# Patient Record
Sex: MALE | Race: WHITE
[De-identification: names, ages, dates, MRNs, and addresses within clinical notes are randomized per-mention and may not be internally consistent; named-entity substitution may affect disease eponyms.]

---

## 2018-07-07 ENCOUNTER — HOSPITAL ENCOUNTER (INPATIENT)
Dept: HOSPITAL 47 - EC | Age: 58
LOS: 2 days | Discharge: HOME | DRG: 287 | End: 2018-07-09
Payer: COMMERCIAL

## 2018-07-07 VITALS — BODY MASS INDEX: 32.8 KG/M2

## 2018-07-07 DIAGNOSIS — E78.5: ICD-10-CM

## 2018-07-07 DIAGNOSIS — I10: ICD-10-CM

## 2018-07-07 DIAGNOSIS — F41.9: ICD-10-CM

## 2018-07-07 DIAGNOSIS — I25.110: Primary | ICD-10-CM

## 2018-07-07 DIAGNOSIS — E66.9: ICD-10-CM

## 2018-07-07 DIAGNOSIS — E78.00: ICD-10-CM

## 2018-07-07 DIAGNOSIS — I25.2: ICD-10-CM

## 2018-07-07 DIAGNOSIS — F12.90: ICD-10-CM

## 2018-07-07 DIAGNOSIS — F17.200: ICD-10-CM

## 2018-07-07 DIAGNOSIS — M1A.9XX0: ICD-10-CM

## 2018-07-07 DIAGNOSIS — Z95.5: ICD-10-CM

## 2018-07-07 DIAGNOSIS — Z79.82: ICD-10-CM

## 2018-07-07 DIAGNOSIS — Z79.899: ICD-10-CM

## 2018-07-07 DIAGNOSIS — I25.82: ICD-10-CM

## 2018-07-07 LAB
ALBUMIN SERPL-MCNC: 4.1 G/DL (ref 3.5–5)
ALP SERPL-CCNC: 66 U/L (ref 38–126)
ALT SERPL-CCNC: 56 U/L (ref 21–72)
ANION GAP SERPL CALC-SCNC: 12 MMOL/L
APTT BLD: 23.1 SEC (ref 22–30)
AST SERPL-CCNC: 33 U/L (ref 17–59)
BASOPHILS # BLD AUTO: 0 K/UL (ref 0–0.2)
BASOPHILS NFR BLD AUTO: 0 %
BUN SERPL-SCNC: 15 MG/DL (ref 9–20)
CALCIUM SPEC-MCNC: 9.2 MG/DL (ref 8.4–10.2)
CHLORIDE SERPL-SCNC: 103 MMOL/L (ref 98–107)
CK SERPL-CCNC: 69 U/L (ref 55–170)
CK SERPL-CCNC: 70 U/L (ref 55–170)
CK SERPL-CCNC: 82 U/L (ref 55–170)
CO2 SERPL-SCNC: 23 MMOL/L (ref 22–30)
EOSINOPHIL # BLD AUTO: 0.1 K/UL (ref 0–0.7)
EOSINOPHIL NFR BLD AUTO: 1 %
ERYTHROCYTE [DISTWIDTH] IN BLOOD BY AUTOMATED COUNT: 4.95 M/UL (ref 4.3–5.9)
ERYTHROCYTE [DISTWIDTH] IN BLOOD: 13 % (ref 11.5–15.5)
GLUCOSE SERPL-MCNC: 112 MG/DL (ref 74–99)
HCT VFR BLD AUTO: 44 % (ref 39–53)
HGB BLD-MCNC: 15.1 GM/DL (ref 13–17.5)
INR PPP: 1 (ref ?–1.2)
LIPASE SERPL-CCNC: 149 U/L (ref 23–300)
LYMPHOCYTES # SPEC AUTO: 1.4 K/UL (ref 1–4.8)
LYMPHOCYTES NFR SPEC AUTO: 14 %
MAGNESIUM SPEC-SCNC: 1.8 MG/DL (ref 1.6–2.3)
MCH RBC QN AUTO: 30.6 PG (ref 25–35)
MCHC RBC AUTO-ENTMCNC: 34.4 G/DL (ref 31–37)
MCV RBC AUTO: 88.9 FL (ref 80–100)
MONOCYTES # BLD AUTO: 0.8 K/UL (ref 0–1)
MONOCYTES NFR BLD AUTO: 8 %
NEUTROPHILS # BLD AUTO: 7.3 K/UL (ref 1.3–7.7)
NEUTROPHILS NFR BLD AUTO: 75 %
PLATELET # BLD AUTO: 238 K/UL (ref 150–450)
POTASSIUM SERPL-SCNC: 4.1 MMOL/L (ref 3.5–5.1)
PROT SERPL-MCNC: 7.1 G/DL (ref 6.3–8.2)
PT BLD: 9.8 SEC (ref 9–12)
SODIUM SERPL-SCNC: 138 MMOL/L (ref 137–145)
TROPONIN I SERPL-MCNC: <0.012 NG/ML (ref 0–0.03)
WBC # BLD AUTO: 9.7 K/UL (ref 3.8–10.6)

## 2018-07-07 PROCEDURE — 85730 THROMBOPLASTIN TIME PARTIAL: CPT

## 2018-07-07 PROCEDURE — 94760 N-INVAS EAR/PLS OXIMETRY 1: CPT

## 2018-07-07 PROCEDURE — 83880 ASSAY OF NATRIURETIC PEPTIDE: CPT

## 2018-07-07 PROCEDURE — 93458 L HRT ARTERY/VENTRICLE ANGIO: CPT

## 2018-07-07 PROCEDURE — 80061 LIPID PANEL: CPT

## 2018-07-07 PROCEDURE — 71046 X-RAY EXAM CHEST 2 VIEWS: CPT

## 2018-07-07 PROCEDURE — 93005 ELECTROCARDIOGRAM TRACING: CPT

## 2018-07-07 PROCEDURE — 85610 PROTHROMBIN TIME: CPT

## 2018-07-07 PROCEDURE — 36415 COLL VENOUS BLD VENIPUNCTURE: CPT

## 2018-07-07 PROCEDURE — 83735 ASSAY OF MAGNESIUM: CPT

## 2018-07-07 PROCEDURE — 80053 COMPREHEN METABOLIC PANEL: CPT

## 2018-07-07 PROCEDURE — 84484 ASSAY OF TROPONIN QUANT: CPT

## 2018-07-07 PROCEDURE — 99291 CRITICAL CARE FIRST HOUR: CPT

## 2018-07-07 PROCEDURE — B2111ZZ FLUOROSCOPY OF MULTIPLE CORONARY ARTERIES USING LOW OSMOLAR CONTRAST: ICD-10-PCS

## 2018-07-07 PROCEDURE — 82550 ASSAY OF CK (CPK): CPT

## 2018-07-07 PROCEDURE — 96374 THER/PROPH/DIAG INJ IV PUSH: CPT

## 2018-07-07 PROCEDURE — B2131ZZ FLUOROSCOPY OF MULTIPLE CORONARY ARTERY BYPASS GRAFTS USING LOW OSMOLAR CONTRAST: ICD-10-PCS

## 2018-07-07 PROCEDURE — 4A023N7 MEASUREMENT OF CARDIAC SAMPLING AND PRESSURE, LEFT HEART, PERCUTANEOUS APPROACH: ICD-10-PCS

## 2018-07-07 PROCEDURE — 93306 TTE W/DOPPLER COMPLETE: CPT

## 2018-07-07 PROCEDURE — 82553 CREATINE MB FRACTION: CPT

## 2018-07-07 PROCEDURE — 85049 AUTOMATED PLATELET COUNT: CPT

## 2018-07-07 PROCEDURE — 83690 ASSAY OF LIPASE: CPT

## 2018-07-07 PROCEDURE — 85025 COMPLETE CBC W/AUTO DIFF WBC: CPT

## 2018-07-07 RX ADMIN — METOPROLOL TARTRATE SCH: 25 TABLET, FILM COATED ORAL at 09:24

## 2018-07-07 RX ADMIN — MIDAZOLAM ONE MG: 1 INJECTION INTRAMUSCULAR; INTRAVENOUS at 10:45

## 2018-07-07 RX ADMIN — HEPARIN SODIUM SCH MLS/HR: 5000 INJECTION, SOLUTION INTRAVENOUS at 07:01

## 2018-07-07 RX ADMIN — CEFAZOLIN SCH MLS/HR: 330 INJECTION, POWDER, FOR SOLUTION INTRAMUSCULAR; INTRAVENOUS at 15:34

## 2018-07-07 RX ADMIN — METOPROLOL TARTRATE SCH MG: 25 TABLET, FILM COATED ORAL at 20:08

## 2018-07-07 RX ADMIN — LISINOPRIL SCH: 10 TABLET ORAL at 09:24

## 2018-07-07 RX ADMIN — RANOLAZINE SCH MG: 500 TABLET, FILM COATED, EXTENDED RELEASE ORAL at 15:35

## 2018-07-07 RX ADMIN — MIDAZOLAM ONE MG: 1 INJECTION INTRAMUSCULAR; INTRAVENOUS at 10:38

## 2018-07-07 NOTE — HP
HISTORY AND PHYSICAL



DATE OF ADMISSION:

07/07/2018.



DATE OF SERVICE:

07/07/2018



PRESENT COMPLAINT:

Chest pressure.



HISTORY OF PRESENTING COMPLAINT:

This is a pleasant 57-year-old patient of Dr. Jeri Cespedes.  Chronic stable medical

conditions include hypertension, hyperlipidemia, anxiety, gout for which the patient is

on allopurinol.  The patient did undergo a coronary bypass in 2003.  For 3 days patient

has been noticing some neck and shoulder aching and then yesterday patient started

having a heaviness across the chest, did not radiate to the neck or arm.  There is no

shortness of breath.  No dizziness.  No lightheadedness.  No perspiration.  It lasted

for a good few hours.  The patient admitted with unstable angina diagnosis.  Troponins

were negative.  EKG showed some ST waves in V1 to V2.  The patient was taken for

cardiac catheterization by Dr. LOLA Marley.  Based on the findings, the decision was

made to do medical management.  The patient is currently lying in bed, comfortable.



REVIEW OF SYSTEMS:

CONSTITUTIONAL:  Tired.

HEENT:  None.

RESPIRATORY:  None.

CARDIOVASCULAR:  As above.

GASTROINTESTINAL:  None.

GENITOURINARY:  None.

MUSCULOSKELETAL:  None.

DERMATOLOGICAL:  None.

HEMATOLOGIC:  None.

LYMPHATIC:  None.

PSYCHIATRY:  Some anxiety.

NEUROLOGICAL:  None.



PAST MEDICAL HISTORY:

Hyperlipidemia, hypertension, coronary artery disease with bypass in 2003.



PAST SURGICAL HISTORY:

Cardiac cath with stent, hernia repair, orthopedic surgery.



PSYCH HISTORY:

Anxiety.



SOCIAL HISTORY:

Does 1 or 2 marijuana a day.  Alcohol occasionally.  .   by trade.



FAMILY HISTORY:

Reviewed, noncontributory to presentation.



HOME MEDICATIONS:

1. Vitamin D2 50,000 units p.o. every 7 days.

2. Lipitor 80 mg a day.

3. Allopurinol 300 mg a day.



ALLERGIES:

None.



EXAMINATION:

Temperature 98, pulse 75, respirations 18, blood pressure 159/83, pulse ox 98% on room

air on presentation.

GENERAL APPEARANCE:  Well-built, BMI 32.8.  Lying in bed, tired-appearing.

EYES:  Pupils equal.  Conjunctivae normal.

HEENT:  External appearance of nose and ears normal.  Oral cavity normal.

NECK:  JVD not raised.  Mass not palpable.

RESPIRATORY:  Effort normal.  Lungs are clear.

CARDIOVASCULAR:  First and second sounds normal.  No edema.

ABDOMEN:  Soft, nontender.  Liver and spleen not palpable.

LYMPHATIC:  No lymph node palpable in neck or axillae.

PSYCHIATRY:  Alert and oriented x3. Mood and affect normal.

NEUROLOGICAL:  Pupils equal.  Cranial nerves grossly intact.  Power and sensation

grossly intact.



INVESTIGATIONS:

White count 9.7, hemoglobin 15.1.  Potassium 4.1, BUN 15, creatinine 0.7.  Troponin x3

negative.  EKG:  Flipped T-waves in V1 and V2.  2D echocardiogram:  EF 55%-60%.



ASSESSMENT:

1. Unstable angina in a patient with known coronary artery disease.  Cardiac

    catheterization results noted.

2. Essential hypertension.

3. Hyperlipidemia.

4. Chronic gout.

5. Anxiety, not otherwise specified.

6. Obesity; BMI of 32.8.

7. Recreational marijuana use.



PLAN:

Patient is advised against that use of marijuana that may precipitate angina.  The

patient did receive IV heparin.  Nitrates were added.  Dose of beta blockers and

Ranexa.  Care was discussed the patient.  Questions were answered.  Cardiac

catheterization was done by Dr. LOLA Marley.





DEEJAY / PARISH: [id number] / Job#: [id number]

## 2018-07-07 NOTE — P.CRDCN
History of Present Illness


Consult date: 07/07/18


Requesting physician: Marco Rodas


Consult reason: chest pain


Chief complaint: Chest pain


History of present illness: 


This is a pleasant 57-year-old  gentleman who follows regularly with 

Dr. VC Marley in the office.  He has a known history of coronary artery disease 

with prior bypass surgery 15 years ago, patient also states that he underwent 

stent placement 5 years ago, history of hypertension, hyperlipidemia, 

nondiabetic, he does not smoke cigarettes but states that he does occasionally 

smoke marijuana.  Home medications include aspirin daily, Lipitor 80 mg daily, 

vitamin D, atenolol 50 mg daily, senna peripheral 10 mg daily, allopurinol 300 

mg daily.  He presents to the hospital on this occasion with symptoms of chest 

pressure and heaviness.  He states that the chest pressure started yesterday 

afternoon, but for the past couple of days he's been noticing a discomfort in 

his neck and shoulder area on the left and on the right.  Patient denies any 

associated shortness of breath or diaphoresis.  He states that the discomfort 

reminds him of what he had prior to his bypass surgery.  EKG on presentation 

here shows a normal sinus rhythm with ST-T wave changes noted in the anterior 

leads.  Upon review of an EKG performed previously these changes were not 

noted.  Chest x-ray does not reveal any active cardiopulmonary disease.  Blood 

pressure 158/80 on admission, 135/70 this morning, heart rate in the 70s, 97% 

on 2 L of oxygen.  White blood cell count 9.7, hemoglobin 15.1, platelet count 

238.  Sodium 138, potassium 4.1, BUN 15, creatinine 0.7.  Magnesium 1.8.  

Troponin 0.012.  At the time of my examination this morning he is currently 

chest pain-free.








Past Medical History


Past Medical History: Chest Pain / Angina, Hyperlipidemia, Hypertension, 

Myocardial Infarction (MI)


Additional Past Medical History / Comment(s): Triple bypass


History of Any Multi-Drug Resistant Organisms: None Reported


Past Surgical History: Heart Catheterization With Stent, Hernia Repair, 

Orthopedic Surgery


Past Psychological History: Anxiety


Smoking Status: Never smoker


Past Alcohol Use History: Daily


Past Drug Use History: Marijuana





Medications and Allergies


 Allergies











Allergy/AdvReac Type Severity Reaction Status Date / Time


 


No Known Allergies Allergy   Verified 07/07/18 05:39














Physical Exam


Vitals: 


 Vital Signs











  Temp Pulse Resp BP Pulse Ox


 


 07/07/18 07:04   70  16  135/77  97


 


 07/07/18 05:35  98 F  75  18  159/83  98








 Intake and Output











 07/06/18 07/07/18 07/07/18





 22:59 06:59 14:59


 


Other:   


 


  Weight  100.698 kg 














PHYSICAL EXAMINATION: 





GENERAL: 57-year-old  gentleman in no acute distress at the time of my 

examination





HEENT: Head is atraumatic, normocephalic.  Pupils equal, round.  Sclera 

anicteric. Conjunctiva are clear.  Mucous membranes of the mouth are moist.  

Neck is supple.  There is no elevated jugular venous pressure.]  bruit is heard.





HEART EXAMINATION: Heart S1, S2 normal.  No murmur or gallop heard.





CHEST EXAMINATION: Lungs are clear to auscultation and precussion. No chest 

wall tenderness is noted on palpation or with deep breathing.





ABDOMEN:  Soft, nontender. Bowel sounds are heard. No organomegaly noted.


 


EXTREMITIES: 2+ peripheral pulses with no evidence of peripheral edema and no 

calf tenderness noted.





NEUROLOGIC patient is awake, alert and oriented ?-3.


 


.


 








Results





 07/07/18 06:00





 07/07/18 06:00


 Cardiac Enzymes











  07/07/18 07/07/18 Range/Units





  06:00 06:00 


 


AST   33  (17-59)  U/L


 


CK-MB (CK-2)  1.6   (0.0-2.4)  ng/mL


 


Troponin I  <0.012   (0.000-0.034)  ng/mL








 Coagulation











  07/07/18 Range/Units





  06:00 


 


PT  9.8  (9.0-12.0)  sec


 


APTT  23.1  (22.0-30.0)  sec








 CBC











  07/07/18 Range/Units





  06:00 


 


WBC  9.7  (3.8-10.6)  k/uL


 


RBC  4.95  (4.30-5.90)  m/uL


 


Hgb  15.1  (13.0-17.5)  gm/dL


 


Hct  44.0  (39.0-53.0)  %


 


Plt Count  238  (150-450)  k/uL








 Comprehensive Metabolic Panel











  07/07/18 Range/Units





  06:00 


 


Sodium  138  (137-145)  mmol/L


 


Potassium  4.1  (3.5-5.1)  mmol/L


 


Chloride  103  ()  mmol/L


 


Carbon Dioxide  23  (22-30)  mmol/L


 


BUN  15  (9-20)  mg/dL


 


Creatinine  0.70  (0.66-1.25)  mg/dL


 


Glucose  112 H  (74-99)  mg/dL


 


Calcium  9.2  (8.4-10.2)  mg/dL


 


AST  33  (17-59)  U/L


 


ALT  56  (21-72)  U/L


 


Alkaline Phosphatase  66  ()  U/L


 


Total Protein  7.1  (6.3-8.2)  g/dL


 


Albumin  4.1  (3.5-5.0)  g/dL








 Current Medications











Generic Name Dose Route Start Last Admin





  Trade Name Freq  PRN Reason Stop Dose Admin


 


Aspirin  325 mg  07/08/18 09:00  





  Aspirin  PO   





  DAILY [facility]   





     





     





     





     


 


Atorvastatin Calcium  80 mg  07/07/18 09:00  





  Lipitor  PO   





  DAILY [facility]   





     





     





     





     


 


Heparin Sodium (Porcine)  0 unit  07/07/18 06:21  





  Heparin  IV   





  Q6HR PRN   





  Low PTT   





     





  Protocol   





     


 


Heparin Sodium/Sodium Chloride  500 mls @ 20 mls/hr  07/07/18 06:30  07/07/18 07

:01





  25,000 unit/ Sodium Chloride  IV   9.93 units/kg/hr





  .Q24H JOYCELYN   19.99 mls/hr





     Administration





     





  Protocol   





     


 


Metoprolol Tartrate  25 mg  07/07/18 09:00  





  Lopressor  PO   





  BID [facility]   





     





     





     





     


 


Nitroglycerin  0.4 mg  07/07/18 06:21  





  Nitrostat  SUBLINGUAL   





  Q5M PRN   





  Chest Pain   





     





     





     








 Intake and Output











 07/06/18 07/07/18 07/07/18





 22:59 06:59 14:59


 


Other:   


 


  Weight  100.698 kg 








 





 07/07/18 06:00 





 07/07/18 06:00 











EKG Interpretations (text)


EKG shows normal sinus rhythm with nonspecific ST-T wave changes in the 

anterior leads








Assessment and Plan


Plan: 


Assessment and plan


#1 chest pressure and heaviness suggestive of possible acute coronary syndrome, 

initial troponin is negative.  EKG shows a normal sinus rhythm with nonspecific 

ST-T wave changes noted in the anterior leads


#2 known history of coronary artery disease with history of coronary artery 

bypass grafting surgery 15 years ago at which time patient underwent radial 

artery graft to the OM, LIMA to the LAD, saphenous vein graft to the RCA, 

subsequent to that patient underwent stenting of the first obtuse marginal 

branch in 2013.


#3 hypertension


#4 hyperlipidemia


#5 marijuana use








Plan


We will obtain an echocardiogram with Doppler study as well as 2 subsequent 

troponins.  A repeat EKG will also be performed.  We will continue with aspirin

, IV heparin, Lipitor 80 mg daily, metoprolol 25 mg one tablet by mouth twice a 

day, we will also resume the patient's lisinopril.  Patient may require cardiac 

catheterization to rule out any obstructive coronary artery disease, the risks 

and the benefits again were explained to the patient in detail.  Further 

recommendations to follow.





DNP note has been reviewed, I agree with a documented findings and plan of 

care.  Patient was seen and examined.

## 2018-07-07 NOTE — CC
CARDIAC CATHETERIZATION REPORT



Mr. Hoang is a 57-year-old gentleman who was admitted with symptoms suggestive of

unstable angina syndrome.  Patient's cardiac enzymes are negative.  Patient has a past

history of stent to the diagonal branch and coronary artery bypass surgery.  Patient

had some T-wave inversion in V1 and V2.  In view of that, the patient was recommended

to have a cardiac catheterization for definitive diagnosis.



PROCEDURE:

The right groin was prepped and draped in the usual manner and the skin was infiltrated

with 2% Xylocaine. The right femoral artery was entered using Seldinger technique, a #6-

Telugu sheath was placed in.  Selective coronary angiography was then performed in

multiple projections and selective injections of the grafts were made. Patient

tolerated the procedure well.



Left main coronary artery is normally patent.

LAD is totally occluded in its ostial portion.

There is a good size septal  and then there is a diagonal branch.  The

diagonal branch is a good caliber blood vessel and the stent is patent with 30%

stenosis in its proximal portion.

The circumflex coronary artery gives rise to several obtuse marginal branches and after

the origin of the 2 small size obtuse marginal branches, circumflex coronary artery is

totally occluded.

Right coronary artery is totally occluded in its proximal portion.

The saphenous vein graft to the RCA is ectatic and the large caliber graft with

slightly sluggish flow but there is no hemodynamically significant stenosis is noted.

There is a good filling of the PLV branch of the right coronary artery.

It is noted the radial artery graft to the obtuse marginal branch is patent.  The LIMA

graft to the LAD is patent.



IMPRESSION:

In view of the above coronary anatomy, we will maximize the medical treatment.





MMODL / IJN: [id number] / Job#: [id number]

## 2018-07-07 NOTE — ED
General Adult HPI





- General


Chief complaint: Chest Pain


Stated complaint: Chest Pain


Time Seen by Provider: 07/07/18 05:53


Source: patient, RN notes reviewed, old records reviewed


Mode of arrival: ambulatory


Limitations: no limitations





- History of Present Illness


Initial comments: 





This is a 57-year-old male the ER for evasive chest pain.  Patient is anterior 

severe chest pain.  Prior history of heart disease prior history of heart bypass

, high blood pressure high cholesterol.  Patient also suffers from angina.  No 

recent cardiac evaluation.  No fevers cough or congestion.





- Related Data


 Allergies











Allergy/AdvReac Type Severity Reaction Status Date / Time


 


No Known Allergies Allergy   Verified 07/07/18 05:39














Review of Systems


ROS Statement: 


Those systems with pertinent positive or pertinent negative responses have been 

documented in the HPI.





ROS Other: All systems not noted in ROS Statement are negative.





Past Medical History


Past Medical History: Chest Pain / Angina, Hyperlipidemia, Hypertension, 

Myocardial Infarction (MI)


Additional Past Medical History / Comment(s): Triple bypass


History of Any Multi-Drug Resistant Organisms: None Reported


Past Surgical History: Heart Catheterization With Stent, Hernia Repair, 

Orthopedic Surgery


Past Psychological History: Anxiety


Smoking Status: Never smoker


Past Alcohol Use History: Daily


Past Drug Use History: Marijuana





General Exam


Limitations: no limitations


General appearance: alert, in no apparent distress


Head exam: Present: atraumatic, normocephalic, normal inspection


Eye exam: Present: normal appearance, PERRL, EOMI.  Absent: scleral icterus, 

conjunctival injection, periorbital swelling


ENT exam: Present: normal exam, mucous membranes moist


Neck exam: Present: normal inspection.  Absent: tenderness, meningismus, 

lymphadenopathy


Respiratory exam: Present: normal lung sounds bilaterally.  Absent: respiratory 

distress, wheezes, rales, rhonchi, stridor


Cardiovascular Exam: Present: regular rate, normal rhythm, normal heart sounds.

  Absent: systolic murmur, diastolic murmur, rubs, gallop, clicks


GI/Abdominal exam: Present: soft, normal bowel sounds.  Absent: distended, 

tenderness, guarding, rebound, rigid


Extremities exam: Present: normal inspection, full ROM, normal capillary 

refill.  Absent: tenderness, pedal edema, joint swelling, calf tenderness


Back exam: Present: normal inspection


Neurological exam: Present: alert, oriented X3, CN II-XII intact


Psychiatric exam: Present: normal affect, normal mood


Skin exam: Present: warm, dry, intact, normal color.  Absent: rash





Course


 Vital Signs











  07/07/18





  05:35


 


Temperature 98 F


 


Pulse Rate 75


 


Respiratory 18





Rate 


 


Blood Pressure 159/83


 


O2 Sat by Pulse 98





Oximetry 














- Reevaluation(s)


Reevaluation #1: 





07/07/18 06:28


Medical record is reviewed and noncontributory


Reevaluation #2: 





07/07/18 06:28


Patient remains a chest pain





EKG Findings





- EKG Comments:


EKG Findings:: EKG shows sinus rhythm rate of 69, , QRS 02, QTc 417





Medical Decision Making





- Medical Decision Making





57 male the ER for evaluation of chest pain.  History of angina and cardiac 

surgery.  Patient admitted admitted for cardiac observation today





- Lab Data


Result diagrams: 


 07/07/18 06:00





 07/07/18 06:00


 Lab Results











  07/07/18 07/07/18 07/07/18 Range/Units





  06:00 06:00 06:00 


 


WBC  9.7    (3.8-10.6)  k/uL


 


RBC  4.95    (4.30-5.90)  m/uL


 


Hgb  15.1    (13.0-17.5)  gm/dL


 


Hct  44.0    (39.0-53.0)  %


 


MCV  88.9    (80.0-100.0)  fL


 


MCH  30.6    (25.0-35.0)  pg


 


MCHC  34.4    (31.0-37.0)  g/dL


 


RDW  13.0    (11.5-15.5)  %


 


Plt Count  238    (150-450)  k/uL


 


Neutrophils %  75    %


 


Lymphocytes %  14    %


 


Monocytes %  8    %


 


Eosinophils %  1    %


 


Basophils %  0    %


 


Neutrophils #  7.3    (1.3-7.7)  k/uL


 


Lymphocytes #  1.4    (1.0-4.8)  k/uL


 


Monocytes #  0.8    (0-1.0)  k/uL


 


Eosinophils #  0.1    (0-0.7)  k/uL


 


Basophils #  0.0    (0-0.2)  k/uL


 


PT    9.8  (9.0-12.0)  sec


 


INR    1.0  (<1.2)  


 


APTT    23.1  (22.0-30.0)  sec


 


Sodium   138   (137-145)  mmol/L


 


Potassium   4.1   (3.5-5.1)  mmol/L


 


Chloride   103   ()  mmol/L


 


Carbon Dioxide   23   (22-30)  mmol/L


 


Anion Gap   12   mmol/L


 


BUN   15   (9-20)  mg/dL


 


Creatinine   0.70   (0.66-1.25)  mg/dL


 


Est GFR (CKD-EPI)AfAm   >90   (>60 ml/min/1.73 sqM)  


 


Est GFR (CKD-EPI)NonAf   >90   (>60 ml/min/1.73 sqM)  


 


Glucose   112 H   (74-99)  mg/dL


 


Calcium   9.2   (8.4-10.2)  mg/dL


 


Magnesium   1.8   (1.6-2.3)  mg/dL


 


Total Bilirubin   0.8   (0.2-1.3)  mg/dL


 


AST   33   (17-59)  U/L


 


ALT   56   (21-72)  U/L


 


Alkaline Phosphatase   66   ()  U/L


 


Total Protein   7.1   (6.3-8.2)  g/dL


 


Albumin   4.1   (3.5-5.0)  g/dL


 


Lipase   149   ()  U/L














- Radiology Data


Radiology results: report reviewed (Chest x-rays negative for acute disease), 

image reviewed





Critical Care Time


Critical Care Time: Yes


Total Critical Care Time: 31





Disposition


Clinical Impression: 


 Chest pain





Disposition: ADMITTED AS IP TO THIS [facility]


Condition: Undetermined


Is patient prescribed a controlled substance at d/c from ED?: No

## 2018-07-07 NOTE — XR
EXAMINATION TYPE: XR chest 2V

 

DATE OF EXAM: 7/7/2018

 

COMPARISON: 4/11/2013

 

HISTORY: Chest pain

 

TECHNIQUE:  Frontal and lateral views of the chest are obtained.

 

FINDINGS:  There is no heart failure nor confluent pneumonic infiltrate. Costophrenic angles are chauncey
r. There are sternal wires. There are chest leads.

 

IMPRESSION:  No active cardiopulmonary disease. No change.

## 2018-07-07 NOTE — ECHOF
Referral Reason:chest pain



MEASUREMENTS

--------

HEIGHT: 175.3 cm

WEIGHT: 100.7 kg

BP: 135/77

RVIDd:   3.0 cm     (< 3.3)

IVSd:   1.3 cm     (0.6 - 1.1)

LVIDd:   5.7 cm     (3.9 - 5.3)

LVPWd:   1.3 cm     (0.6 - 1.1)

IVSs:   1.5 cm

LVIDs:   4.0 cm

LVPWs:   1.4 cm

LAESV Index (A-L):   31.64 ml/m

Ao Diam:   3.7 cm     (2.0 - 3.7)

AV Cusp:   2.0 cm     (1.5 - 2.6)

LA Diam:   4.3 cm     (2.7 - 3.8)

EPSS:   0.8 cm

MV E Amado:   1.26 m/s

MV DecT:   231 ms

MV A Amado:   0.74 m/s

MV E/A Ratio:   1.70 

RAP:   5.00 mmHg

RVSP:   30.40 mmHg

MV EF SLOPE:   88.03 mm/s     (70 - 150)

MV EXCURSION:   1.85 cm     (> 18.000)







FINDINGS

--------

Sinus rhythm.

This was a technically adequate study.

The left ventricular size is normal.   There is mild concentric left ventricular hypertrophy.   Overa
ll left ventricular systolic function is normal with, an EF between 55 - 60 %.

The right ventricle is normal in size and function.

LA is midly dilated 29-33ml/m2.

RA appears enlarged.

Aortic valve is trileaflet and is mildly thickened.   There is no evidence of aortic regurgitation.  
 There is no evidence of aortic stenosis.

The mitral valve leaflets are mildly thickened.   There is trace to mild mitral regurgitation.

Mild tricuspid regurgitation present.   Right ventricular systolic pressure is normal at < 35 mmHg.  
 There is no evidence of pulmonary hypertension.

Trace/mild (physiologic)  pulmonic regurgitation.

The aortic root size is normal.

Normal inferior vena cava with normal inspiratory collapse consistent with estimated right atrial pre
ssure of  5 mmHg.

There is no pericardial effusion.



CONCLUSIONS

--------

1. Sinus rhythm.

2. This was a technically adequate study.

3. The left ventricular size is normal.

4. There is mild concentric left ventricular hypertrophy.

5. Overall left ventricular systolic function is normal with, an EF between 55 - 60 %.

6. LA is midly dilated 29-33ml/m2.

7. RA appears enlarged.

8. Aortic valve is trileaflet and is mildly thickened.

9. The mitral valve leaflets are mildly thickened.

10. There is trace to mild mitral regurgitation.

11. Mild tricuspid regurgitation present.

12. Right ventricular systolic pressure is normal at < 35 mmHg.

13. There is no evidence of pulmonary hypertension.

14. Trace/mild (physiologic)  pulmonic regurgitation.

15. The aortic root size is normal.

16. There is no pericardial effusion.





SONOGRAPHER: Stepan Medina RDCS

## 2018-07-08 VITALS — RESPIRATION RATE: 16 BRPM

## 2018-07-08 LAB
CHOLEST SERPL-MCNC: 119 MG/DL (ref ?–200)
HDLC SERPL-MCNC: 37 MG/DL (ref 40–60)
LDLC SERPL CALC-MCNC: 54 MG/DL (ref 0–99)
PLATELET # BLD AUTO: 240 K/UL (ref 150–450)
TRIGL SERPL-MCNC: 141 MG/DL (ref ?–150)

## 2018-07-08 RX ADMIN — ASPIRIN 325 MG ORAL TABLET SCH MG: 325 PILL ORAL at 08:17

## 2018-07-08 RX ADMIN — CEFAZOLIN SCH MLS/HR: 330 INJECTION, POWDER, FOR SOLUTION INTRAMUSCULAR; INTRAVENOUS at 08:07

## 2018-07-08 RX ADMIN — METOPROLOL TARTRATE SCH MG: 25 TABLET, FILM COATED ORAL at 20:11

## 2018-07-08 RX ADMIN — ALLOPURINOL SCH MG: 300 TABLET ORAL at 08:18

## 2018-07-08 RX ADMIN — HEPARIN SODIUM SCH: 5000 INJECTION, SOLUTION INTRAVENOUS at 08:06

## 2018-07-08 RX ADMIN — RANOLAZINE SCH MG: 500 TABLET, FILM COATED, EXTENDED RELEASE ORAL at 20:11

## 2018-07-08 RX ADMIN — ISOSORBIDE MONONITRATE SCH MG: 30 TABLET, EXTENDED RELEASE ORAL at 08:17

## 2018-07-08 RX ADMIN — ACETAMINOPHEN PRN MG: 325 TABLET, FILM COATED ORAL at 16:44

## 2018-07-08 RX ADMIN — ACETAMINOPHEN PRN MG: 325 TABLET, FILM COATED ORAL at 08:16

## 2018-07-08 RX ADMIN — LISINOPRIL SCH MG: 10 TABLET ORAL at 08:18

## 2018-07-08 RX ADMIN — RANOLAZINE SCH MG: 500 TABLET, FILM COATED, EXTENDED RELEASE ORAL at 08:17

## 2018-07-08 RX ADMIN — CEFAZOLIN SCH: 330 INJECTION, POWDER, FOR SOLUTION INTRAMUSCULAR; INTRAVENOUS at 16:57

## 2018-07-08 RX ADMIN — METOPROLOL TARTRATE SCH MG: 25 TABLET, FILM COATED ORAL at 08:18

## 2018-07-08 RX ADMIN — ATORVASTATIN CALCIUM SCH MG: 80 TABLET, FILM COATED ORAL at 08:18

## 2018-07-08 NOTE — P.PN
Subjective


Progress Note Date: 07/08/18


Principal diagnosis: 





Chest discomfort





This is a pleasant 57-year-old gentleman who sees Dr. VC Marley with history of 

coronary artery disease and status post coronary artery bypass grafting 3 with 

LIMA to LAD, SVG to OM, and SVG into RCA, presented to the hospital with chest 

discomfort concerning for unstable angina.  He underwent heart catheterization 

yesterday and that revealed severe triple-vessel CAD with patent LIMA to LAD, 

patent SVG to OM, and patent SVG to RCA with quite sluggish flow in the SVG to 

RCA but without any hemodynamically significant stenosis.  Maximize medical 

treatment was recommended and the patient was started on Imdur in addition to 

an excellent.





On follow-up with him today, he still having chest discomfort definitely better 

compared to before but still there.  No shortness of breath.  No dizziness or 

lightheadedness.





The blood pressure and heart rate are within normal limits.





I recommended keeping the patient for additional 24 hours.  We'll continue 

following up with him.





Objective





- Vital Signs


Vital signs: 


 Vital Signs











Temp  96.7 F L  07/08/18 04:00


 


Pulse  75   07/08/18 04:00


 


Resp  16   07/08/18 04:00


 


BP  125/76   07/08/18 04:00


 


Pulse Ox  96   07/08/18 08:12








 Intake & Output











 07/07/18 07/08/18 07/08/18





 18:59 06:59 18:59


 


Intake Total 1020  


 


Output Total 0 0 


 


Balance 1020 0 


 


Weight 100.69 kg 102.1 kg 


 


Intake:   


 


    


 


  Oral 720  


 


Output:   


 


  Urine 0  


 


  Stool 0 0 


 


  Urine/Stool Mix 0  


 


  Emesis 0  


 


  Oral Regurgitation 0  


 


Other:   


 


  # Voids 1 2 


 


  # Bowel Movements 0  














- Constitutional


General appearance: Present: no acute distress





- Respiratory


Respiratory: bilateral: CTA





- Cardiovascular


Rhythm: regular


Heart sounds: normal: S1, S2





- Labs


CBC & Chem 7: 


 07/08/18 05:50





 07/07/18 06:00


Labs: 


 Abnormal Lab Results - Last 24 Hours (Table)











  07/08/18 Range/Units





  05:50 


 


HDL Cholesterol  37 L  (40-60)  mg/dL














Assessment and Plan


Assessment: 





Assessment


#1 chest discomfort likely to be anginal.


#2 severe underlying CAD and status post CABG as described above


#3 multiple comorbid conditions including hypertension and dyslipidemia





Plan 


#1 continue the current medical regimen including the Imdur as well as Ranexa


#2 continue monitoring the patient for additional 24 hours 


#3 follow-up with the patient.

## 2018-07-08 NOTE — PN
PROGRESS NOTE



DATE OF SERVICE:

July 8, 2018.



PRESENTING COMPLAINT:

Chest pressure.



INTERVAL HISTORY:

This is a patient admitted with unstable angina with cardiac catheterization.  Decision

was made for medical management.  Imdur and Ranexa was added.  The patient had some

more episodes of chest pressure, though feels a bit better.



REVIEW OF SYSTEMS:

Done for constitutional, cardiovascular, GI, pulmonary and relevant findings as above.



CURRENT MEDICATIONS:

Reviewed that include Imdur and Ranexa.



PHYSICAL EXAMINATION:

VITAL SIGNS: Temperature 97, pulse 65, respiratory 18, blood pressure 127/71, pulse ox

95% on room air.

GENERAL APPEARANCE:  Sitting up edge of the bed.

EYES: Pupils are equal. Conjunctivae normal.

HEENT:  External appearance of nose and ears normal.

NECK:  JVD not raised.  Mass not palpable.

RESPIRATORY:  Effort normal.  Lungs are clear.

CARDIOVASCULAR:  1st and second sounds normal.  No edema.

ABDOMEN:  Soft, nontender.  Liver and spleen not palpable.  PSYCHIATRY:  Alert and

oriented x3.  Mood and affect normal.



INVESTIGATIONS:

No labs from today.  LDL is 54.



ASSESSMENT:

1. Unstable angina in a patient with known coronary artery disease status post cardiac

    catheterization.

2. Essential hypertension.

3. Hyperlipidemia.

4. Chronic gout.

5. Anxiety not otherwise specified.

6. Obesity; BMI 32.8.

7. Recreational marijuana use.

8. Coronary artery disease with no prior history of coronary bypass.



PLAN:

Medications were adjusted by Cardiology.  Patient is still having some headaches, felt

to be from Imdur.  If this remains a problem, then we may have to discontinue the same.

I encouraged the patient to ambulate.





MMODL / IJN: [id number] / Job#: [id number]

## 2018-07-09 VITALS — TEMPERATURE: 97 F

## 2018-07-09 VITALS — SYSTOLIC BLOOD PRESSURE: 109 MMHG | DIASTOLIC BLOOD PRESSURE: 71 MMHG | HEART RATE: 81 BPM

## 2018-07-09 LAB — PLATELET # BLD AUTO: 245 K/UL (ref 150–450)

## 2018-07-09 RX ADMIN — RANOLAZINE SCH MG: 500 TABLET, FILM COATED, EXTENDED RELEASE ORAL at 08:58

## 2018-07-09 RX ADMIN — LISINOPRIL SCH MG: 10 TABLET ORAL at 08:58

## 2018-07-09 RX ADMIN — ACETAMINOPHEN PRN MG: 325 TABLET, FILM COATED ORAL at 15:22

## 2018-07-09 RX ADMIN — HEPARIN SODIUM SCH: 5000 INJECTION, SOLUTION INTRAVENOUS at 08:59

## 2018-07-09 RX ADMIN — ASPIRIN 325 MG ORAL TABLET SCH MG: 325 PILL ORAL at 08:58

## 2018-07-09 RX ADMIN — ATORVASTATIN CALCIUM SCH MG: 80 TABLET, FILM COATED ORAL at 08:58

## 2018-07-09 RX ADMIN — ISOSORBIDE MONONITRATE SCH MG: 30 TABLET, EXTENDED RELEASE ORAL at 08:58

## 2018-07-09 RX ADMIN — METOPROLOL TARTRATE SCH MG: 25 TABLET, FILM COATED ORAL at 08:58

## 2018-07-09 RX ADMIN — ALLOPURINOL SCH MG: 300 TABLET ORAL at 08:58

## 2018-07-09 NOTE — P.PN
Subjective


Progress Note Date: 07/09/18





This is a pleasant 57-year-old  gentleman who follows regularly with 

Dr. VC Marley in the office.  He has a known history of coronary artery disease 

with prior bypass surgery 15 years ago, patient also states that he underwent 

stent placement 5 years ago, history of hypertension, hyperlipidemia, 

nondiabetic, he does not smoke cigarettes but states that he does occasionally 

smoke marijuana.  Home medications include aspirin daily, Lipitor 80 mg daily, 

vitamin D, atenolol 50 mg daily, senna peripheral 10 mg daily, allopurinol 300 

mg daily.  He presents to the hospital on this occasion with symptoms of chest 

pressure and heaviness.  He states that the chest pressure started yesterday 

afternoon, but for the past couple of days he's been noticing a discomfort in 

his neck and shoulder area on the left and on the right.  Patient denies any 

associated shortness of breath or diaphoresis.  He states that the discomfort 

reminds him of what he had prior to his bypass surgery.  EKG on presentation 

here shows a normal sinus rhythm with ST-T wave changes noted in the anterior 

leads.  Upon review of an EKG performed previously these changes were not 

noted.  Chest x-ray does not reveal any active cardiopulmonary disease.  Blood 

pressure 158/80 on admission, 135/70 this morning, heart rate in the 70s, 97% 

on 2 L of oxygen.  White blood cell count 9.7, hemoglobin 15.1, platelet count 

238.  Sodium 138, potassium 4.1, BUN 15, creatinine 0.7.  Magnesium 1.8.  

Troponin 0.012.  At the time of my examination this morning he is currently 

chest pain-free.








07/09/2018


Patient was seen and examined this morning, he underwent a cardiac 

catheterization by Dr. VC Marley on the weekend, the decision was made to 

maximize the patient's medical therapy.  Imdur and Ranexa were added to his 

medication regime.  At the time of my examination this morning, he complained 

of a mild discomfort in the left upper chest area which she rated at 

approximately 01, he's been ambulating in the beard without any chest 

discomfort.  From cardiology's perspective, he may be able to be discharged 

home today to follow-up with Dr. VC Marley in the office.








Objective





- Vital Signs


Vital signs: 


 Vital Signs











Temp  97.0 F L  07/09/18 09:01


 


Pulse  88   07/09/18 09:01


 


Resp  16   07/09/18 09:01


 


BP  130/85   07/09/18 09:01


 


Pulse Ox  96   07/09/18 09:01








 Intake & Output











 07/08/18 07/09/18 07/09/18





 18:59 06:59 18:59


 


Intake Total 830  360


 


Balance 830  360


 


Weight  99.382 kg 


 


Intake:   


 


  Intake, IV Titration 600  





  Amount   


 


    Sodium Chloride 0.9% 1, 600  





    000 ml @ 75 mls/hr IV .   





    [id number] [facility] Rx#:[id number]   


 


  Oral 230  360


 


Other:   


 


  # Voids 3 2 














- Exam





PHYSICAL EXAMINATION: 





GENERAL: 57-year-old  gentleman in no acute distress at the time of my 

examination





HEENT: Head is atraumatic, normocephalic.  Pupils equal, round.  Sclera 

anicteric. Conjunctiva are clear.  Mucous membranes of the mouth are moist.  

Neck is supple.  There is no elevated jugular venous pressure.]  No carotid 

bruit is heard.





HEART EXAMINATION: Heart S1, S2 normal.  No murmur or gallop heard.





CHEST EXAMINATION: Lungs are clear to auscultation and precussion. No chest 

wall tenderness is noted on palpation or with deep breathing.





ABDOMEN:  Soft, nontender. Bowel sounds are heard. No organomegaly noted.


 


EXTREMITIES: 2+ peripheral pulses with no evidence of peripheral edema and no 

calf tenderness noted.





NEUROLOGIC patient is awake, alert and oriented OX3.


 


.


 











- Labs


CBC & Chem 7: 


 07/09/18 05:55





 07/07/18 06:00





Assessment and Plan


Plan: 


Assessment and plan


#1 chest pressure and heaviness suggestive of possible acute coronary syndrome, 

initial troponin is negative.  EKG shows a normal sinus rhythm with nonspecific 

ST-T wave changes noted in the anterior leads.  Status post cardiac 

catheterization maximized medical therapy recommended.


#2 known history of coronary artery disease with history of coronary artery 

bypass grafting surgery 15 years ago at which time patient underwent radial 

artery graft to the OM, LIMA to the LAD, saphenous vein graft to the RCA, 

subsequent to that patient underwent stenting of the first obtuse marginal 

branch in 2013.


#3 hypertension


#4 hyperlipidemia


#5 marijuana use








Plan


Echocardiogram with Doppler study was performed which revealed a normal left 

ventricular systolic function.  From cardiology's perspective, patient may be 

able to be discharged home today.  We will make him a follow-up appointment in 

the office to see Dr. VC Marley post discharge.  Patient will continue on 

aspirin 81 mg daily, Lipitor 80 mg daily, Imdur 30 mg daily, lisinopril 10 mg 

daily, metoprolol 25 mg by mouth twice a day, Ranexa 500 mg twice a day, and 

sublingual nitroglycerin as needed for chest pain.








DNP note has been reviewed, I agree with a documented findings and plan of 

care.  Patient was seen and examined.

## 2018-07-09 NOTE — DS
DISCHARGE SUMMARY



DATE OF ADMISSION:

18.



DATE OF DISCHARGE:

18



FINAL DIAGNOSES:

1. Unstable angina in patient with known coronary artery disease.

2. Essential hypertension.

3. Hyperlipidemia.

4. Chronic gout.

5. Anxiety, not otherwise specified.

6. Obesity; BMI 32.8.

7. Recreational marijuana use.

8. Coronary artery disease with prior history of cardiac catheterization with stent.



PROCEDURE:

Cardiac catheterization.



CONSULTATIONS:

Dr. Hopkins from Cardiology.



HOSPITAL COURSE:

This patient with unstable angina.  Cardiac catheterization was carried out.  More

details in Dr. Hopkins's note.  Medical management was decided. Imdur and Ranexa were

added. Today, patient is up and about no further cardiac symptoms.  I did speak to him

at length about his medications.  Questions were answered.  He was wondering if

anything further can be done with new techniques.  I did suggest he should talk to Dr. Hopkins in the office when he sees him again and he may direct him accordingly if any

other options are available.  Echocardiogram showed EF of 55-60%.  No obvious wall

motion abnormality was found.



Discussion and discharge plannin minutes.



EXAM:

Lungs are clear. Cardiovascular: First and second sounds normal.



DISCHARGE MEDICATIONS:

1. Allopurinol 300 mg a day.

2. Lipitor 80 mg a day.

3. Vitamin D2 11182 units p.o. every 7 days.

4. Aspirin 81 mg a day.

5. Imdur ER 30 mg a day.

6. Zestril 10 mg a day.

7. Lopressor 25 p.o. b.i.d.

8. Nitrostat 0.4 sublingual q.5 p.r.n.

9. Ranexa 500 mg p.o. q.12 60 tablets.

10.Thiamine 100 mg p.o. daily.



FOLLOWUP:

Follow up with Dr. Jrei Cespedes on 18, follow up with Dr. LOLA Marley on 18.

Initially I spoke to Latoya from Cardiology.  She will call the patient to change the

dose of aspirin to 81 mg and I will change that in the discharge summary.





MMODL / IJN: [id number] / Job#: [id number]

## 2020-02-05 ENCOUNTER — HOSPITAL ENCOUNTER (OUTPATIENT)
Dept: HOSPITAL 47 - LABPAT | Age: 60
Discharge: HOME | End: 2020-02-05
Attending: ORTHOPAEDIC SURGERY
Payer: COMMERCIAL

## 2020-02-05 DIAGNOSIS — Z01.812: ICD-10-CM

## 2020-02-05 DIAGNOSIS — Z01.818: Primary | ICD-10-CM

## 2020-02-05 DIAGNOSIS — M67.461: ICD-10-CM

## 2020-02-05 LAB
ANION GAP SERPL CALC-SCNC: 9 MMOL/L
BASOPHILS # BLD AUTO: 0.1 K/UL (ref 0–0.2)
BASOPHILS NFR BLD AUTO: 1 %
CHLORIDE SERPL-SCNC: 105 MMOL/L (ref 98–107)
CO2 SERPL-SCNC: 25 MMOL/L (ref 22–30)
EOSINOPHIL # BLD AUTO: 0.1 K/UL (ref 0–0.7)
EOSINOPHIL NFR BLD AUTO: 1 %
ERYTHROCYTE [DISTWIDTH] IN BLOOD BY AUTOMATED COUNT: 5.18 M/UL (ref 4.3–5.9)
ERYTHROCYTE [DISTWIDTH] IN BLOOD: 13.1 % (ref 11.5–15.5)
HCT VFR BLD AUTO: 46.4 % (ref 39–53)
HGB BLD-MCNC: 16.1 GM/DL (ref 13–17.5)
LYMPHOCYTES # SPEC AUTO: 1.9 K/UL (ref 1–4.8)
LYMPHOCYTES NFR SPEC AUTO: 25 %
MCH RBC QN AUTO: 31.2 PG (ref 25–35)
MCHC RBC AUTO-ENTMCNC: 34.8 G/DL (ref 31–37)
MCV RBC AUTO: 89.6 FL (ref 80–100)
MONOCYTES # BLD AUTO: 0.5 K/UL (ref 0–1)
MONOCYTES NFR BLD AUTO: 7 %
NEUTROPHILS # BLD AUTO: 4.6 K/UL (ref 1.3–7.7)
NEUTROPHILS NFR BLD AUTO: 62 %
PLATELET # BLD AUTO: 276 K/UL (ref 150–450)
POTASSIUM SERPL-SCNC: 4.5 MMOL/L (ref 3.5–5.1)
SODIUM SERPL-SCNC: 139 MMOL/L (ref 137–145)
WBC # BLD AUTO: 7.5 K/UL (ref 3.8–10.6)

## 2020-02-05 PROCEDURE — 80051 ELECTROLYTE PANEL: CPT

## 2020-02-05 PROCEDURE — 85025 COMPLETE CBC W/AUTO DIFF WBC: CPT

## 2020-02-05 PROCEDURE — 93005 ELECTROCARDIOGRAM TRACING: CPT

## 2020-02-19 NOTE — HP
HISTORY AND PHYSICAL



DATE OF SURGERY:

02/20/2020



Thierno Hoang is a room is 59-year-old patient seen with a symptomatic soft tissue

mass along the lateral aspect of the right knee. It was consistent with a ganglion

cyst.  We discussed options. He elected to proceed with surgical excision.  Consent was

obtained.



PAST MEDICAL HISTORY:

Cardiovascular disease, hypertension, hyperlipidemia.



PAST SURGICAL HISTORY:

Herniorrhaphy, bypass surgery, cardiac catheterization.



DAILY MEDICATIONS:

Allopurinol, aspirin, atorvastatin, lisinopril.



ALLERGIES:

None.



SOCIAL HISTORY:

Denies current tobacco use.



PHYSICAL EVALUATION OF THE RIGHT KNEE:

There is a large lateral soft tissue mass that measures 2.5 cm x 2.5 cm, is raised 1.5

to 2 cm, is freely mobile, not significantly tender, not causing a skin discoloration.

This is consistent with a ganglion cyst.  Range of motion of the knee is 0-130.  There

is no effusion present.  Ligaments are stable.  He does have some crepitus along the

medial lateral compartments.



RADIOGRAPHS OF THE RIGHT KNEE:

Reveal osteoarthritic changes.



IMPRESSION:

1. Right knee symptomatic soft tissue mass/ganglion cyst.

2. Right knee osteoarthritis.

3. Coronary artery disease.

4. Hypertension.

5. Hyperlipidemia.



PLAN:

Excision soft tissue mass/ganglion cyst, right knee.





MMODL / IJN: [id number] / Job#: [id number]

## 2020-02-20 ENCOUNTER — HOSPITAL ENCOUNTER (OUTPATIENT)
Dept: HOSPITAL 47 - OR | Age: 60
Discharge: HOME | End: 2020-02-20
Attending: ORTHOPAEDIC SURGERY
Payer: COMMERCIAL

## 2020-02-20 VITALS — TEMPERATURE: 97.6 F

## 2020-02-20 VITALS — RESPIRATION RATE: 18 BRPM

## 2020-02-20 VITALS — HEART RATE: 71 BPM | DIASTOLIC BLOOD PRESSURE: 75 MMHG | SYSTOLIC BLOOD PRESSURE: 118 MMHG

## 2020-02-20 DIAGNOSIS — Z95.5: ICD-10-CM

## 2020-02-20 DIAGNOSIS — I25.2: ICD-10-CM

## 2020-02-20 DIAGNOSIS — Z95.1: ICD-10-CM

## 2020-02-20 DIAGNOSIS — Z79.899: ICD-10-CM

## 2020-02-20 DIAGNOSIS — Z79.82: ICD-10-CM

## 2020-02-20 DIAGNOSIS — I10: ICD-10-CM

## 2020-02-20 DIAGNOSIS — Z79.891: ICD-10-CM

## 2020-02-20 DIAGNOSIS — M67.461: Primary | ICD-10-CM

## 2020-02-20 DIAGNOSIS — I25.811: ICD-10-CM

## 2020-02-20 DIAGNOSIS — E78.5: ICD-10-CM

## 2020-02-20 PROCEDURE — 27337 EXC THIGH/KNEE LES SC 3 CM/>: CPT

## 2020-02-20 PROCEDURE — 88304 TISSUE EXAM BY PATHOLOGIST: CPT

## 2020-02-20 NOTE — P.OP
Date of Procedure: 02/20/20


Preoperative Diagnosis: 


Right knee lateral soft tissue mass


Postoperative Diagnosis: 


Right knee lateral soft tissue mass/ganglion cyst


Procedure(s) Performed: 


Excision soft tissue mass right knee measuring 4 x 4 cm raised 3.5 cm


Anesthesia: EARLE local


Surgeon: Kai Anthony


Assistant #1: Shawn Kwon


Estimated Blood Loss (ml): 12


Pathology: none sent


Condition: stable


Disposition: PACU


Indications for Procedure: 


59-year-old patient seen with a symptomatic soft tissue mass along the lateral 

aspect of the right knee.  We discussed treatment options.  He elected to 

proceed with surgical excision.


Operative Findings: 


See description of procedure


Description of Procedure: 


The patient was taken to the operative suite.  The patient did receive 

preoperative IV antibiotics.  The patient underwent a general anesthetic by the 

department of anesthesia.  A well-padded tourniquet was placed on the proximal 

right thigh.  The right lower extremity was now prepped and draped in the normal

sterile orthopedic fashion.  The tourniquet was not elevated to 300.  I made a 

4.5 cm incision centered over the soft tissue mass which was a long lateral 

aspect of the right knee.  The soft tissue mass itself appeared to measure about

4 x 4 centimeters and raised 2.5 cm's.  I now dissected around the soft tissue 

mass which appeared to be ganglion cyst.  We now punctured the ganglion cyst and

removed classic-looking gelatinous fluid consistent with ganglion cyst.  I 

carefully dissected around the capsule.  The capsule due to did appear to 

emanate from the joint capsule laterally.  We now clipped the stalk.  I now 

explore the area.  There no additional abnormal tissue in the area.  There was 

irrigated.  The defect the capsule was repaired with 2-0 Vicryl.  The subcu soft

tissues were repaired with 2-0 Vicryl tacking down the subcu soft tissues to the

outer capsule area.  The skin margins were proximal nylon suture.  I infiltrated

the incision area with approximately 20 mL quarter percent plain Marcaine.  We 

applied sterile dressings.  The tourniquet was released with immediate capillary

refill the entire extremity noted.  A sterile Ace bandage was applied.  The 

patient was now awakened and transferred to recovery having tolerated the 

procedure well.  Jeff FARNSWORTH assisted with the procedure.

## 2022-04-14 ENCOUNTER — HOSPITAL ENCOUNTER (INPATIENT)
Dept: HOSPITAL 47 - EC | Age: 62
LOS: 10 days | Discharge: HOME | DRG: 271 | End: 2022-04-24
Attending: HOSPITALIST | Admitting: HOSPITALIST
Payer: COMMERCIAL

## 2022-04-14 VITALS — BODY MASS INDEX: 32.1 KG/M2

## 2022-04-14 DIAGNOSIS — I49.3: ICD-10-CM

## 2022-04-14 DIAGNOSIS — Z71.89: ICD-10-CM

## 2022-04-14 DIAGNOSIS — I47.2: ICD-10-CM

## 2022-04-14 DIAGNOSIS — I25.118: ICD-10-CM

## 2022-04-14 DIAGNOSIS — Z79.01: ICD-10-CM

## 2022-04-14 DIAGNOSIS — Z79.02: ICD-10-CM

## 2022-04-14 DIAGNOSIS — Z95.5: ICD-10-CM

## 2022-04-14 DIAGNOSIS — I48.92: ICD-10-CM

## 2022-04-14 DIAGNOSIS — I21.19: Primary | ICD-10-CM

## 2022-04-14 DIAGNOSIS — Z79.82: ICD-10-CM

## 2022-04-14 DIAGNOSIS — I10: ICD-10-CM

## 2022-04-14 DIAGNOSIS — J44.9: ICD-10-CM

## 2022-04-14 DIAGNOSIS — I25.5: ICD-10-CM

## 2022-04-14 DIAGNOSIS — I25.718: ICD-10-CM

## 2022-04-14 DIAGNOSIS — F41.9: ICD-10-CM

## 2022-04-14 DIAGNOSIS — I23.7: ICD-10-CM

## 2022-04-14 DIAGNOSIS — Z79.899: ICD-10-CM

## 2022-04-14 DIAGNOSIS — E78.5: ICD-10-CM

## 2022-04-14 DIAGNOSIS — M1A.9XX0: ICD-10-CM

## 2022-04-14 DIAGNOSIS — Z98.890: ICD-10-CM

## 2022-04-14 DIAGNOSIS — Z95.1: ICD-10-CM

## 2022-04-14 DIAGNOSIS — I48.0: ICD-10-CM

## 2022-04-14 DIAGNOSIS — Z28.310: ICD-10-CM

## 2022-04-14 DIAGNOSIS — E66.9: ICD-10-CM

## 2022-04-14 DIAGNOSIS — I80.8: ICD-10-CM

## 2022-04-14 LAB
ALBUMIN SERPL-MCNC: 4.3 G/DL (ref 3.5–5)
ALP SERPL-CCNC: 68 U/L (ref 38–126)
ALT SERPL-CCNC: 51 U/L (ref 4–49)
ANION GAP SERPL CALC-SCNC: 8 MMOL/L
APTT BLD: 22.4 SEC (ref 22–30)
AST SERPL-CCNC: 355 U/L (ref 17–59)
BASOPHILS # BLD AUTO: 0 K/UL (ref 0–0.2)
BASOPHILS NFR BLD AUTO: 0 %
BUN SERPL-SCNC: 12 MG/DL (ref 9–20)
CALCIUM SPEC-MCNC: 9.2 MG/DL (ref 8.4–10.2)
CHLORIDE SERPL-SCNC: 103 MMOL/L (ref 98–107)
CO2 SERPL-SCNC: 24 MMOL/L (ref 22–30)
EOSINOPHIL # BLD AUTO: 0 K/UL (ref 0–0.7)
EOSINOPHIL NFR BLD AUTO: 0 %
ERYTHROCYTE [DISTWIDTH] IN BLOOD BY AUTOMATED COUNT: 4.91 M/UL (ref 4.3–5.9)
ERYTHROCYTE [DISTWIDTH] IN BLOOD: 13.1 % (ref 11.5–15.5)
GLUCOSE BLD-MCNC: 105 MG/DL (ref 75–99)
GLUCOSE SERPL-MCNC: 133 MG/DL (ref 74–99)
HCT VFR BLD AUTO: 45.1 % (ref 39–53)
HGB BLD-MCNC: 15.6 GM/DL (ref 13–17.5)
INR PPP: 1 (ref ?–1.2)
LYMPHOCYTES # SPEC AUTO: 1.3 K/UL (ref 1–4.8)
LYMPHOCYTES NFR SPEC AUTO: 8 %
MCH RBC QN AUTO: 31.8 PG (ref 25–35)
MCHC RBC AUTO-ENTMCNC: 34.5 G/DL (ref 31–37)
MCV RBC AUTO: 92 FL (ref 80–100)
MONOCYTES # BLD AUTO: 0.9 K/UL (ref 0–1)
MONOCYTES NFR BLD AUTO: 6 %
NEUTROPHILS # BLD AUTO: 13 K/UL (ref 1.3–7.7)
NEUTROPHILS NFR BLD AUTO: 84 %
PLATELET # BLD AUTO: 295 K/UL (ref 150–450)
POTASSIUM SERPL-SCNC: 3.9 MMOL/L (ref 3.5–5.1)
PROT SERPL-MCNC: 7.5 G/DL (ref 6.3–8.2)
PT BLD: 10.4 SEC (ref 9–12)
SODIUM SERPL-SCNC: 135 MMOL/L (ref 137–145)
WBC # BLD AUTO: 15.5 K/UL (ref 3.8–10.6)

## 2022-04-14 PROCEDURE — 92973 PRQ TRLUML C MCHN ASP THRMBC: CPT

## 2022-04-14 PROCEDURE — 02703EZ DILATION OF CORONARY ARTERY, ONE ARTERY WITH TWO INTRALUMINAL DEVICES, PERCUTANEOUS APPROACH: ICD-10-PCS

## 2022-04-14 PROCEDURE — 83735 ASSAY OF MAGNESIUM: CPT

## 2022-04-14 PROCEDURE — 84484 ASSAY OF TROPONIN QUANT: CPT

## 2022-04-14 PROCEDURE — 80048 BASIC METABOLIC PNL TOTAL CA: CPT

## 2022-04-14 PROCEDURE — 85025 COMPLETE CBC W/AUTO DIFF WBC: CPT

## 2022-04-14 PROCEDURE — 85730 THROMBOPLASTIN TIME PARTIAL: CPT

## 2022-04-14 PROCEDURE — 93306 TTE W/DOPPLER COMPLETE: CPT

## 2022-04-14 PROCEDURE — 96374 THER/PROPH/DIAG INJ IV PUSH: CPT

## 2022-04-14 PROCEDURE — 71045 X-RAY EXAM CHEST 1 VIEW: CPT

## 2022-04-14 PROCEDURE — 4A023N7 MEASUREMENT OF CARDIAC SAMPLING AND PRESSURE, LEFT HEART, PERCUTANEOUS APPROACH: ICD-10-PCS

## 2022-04-14 PROCEDURE — 80053 COMPREHEN METABOLIC PANEL: CPT

## 2022-04-14 PROCEDURE — 87324 CLOSTRIDIUM AG IA: CPT

## 2022-04-14 PROCEDURE — 85610 PROTHROMBIN TIME: CPT

## 2022-04-14 PROCEDURE — B2121ZZ FLUOROSCOPY OF SINGLE CORONARY ARTERY BYPASS GRAFT USING LOW OSMOLAR CONTRAST: ICD-10-PCS

## 2022-04-14 PROCEDURE — 36415 COLL VENOUS BLD VENIPUNCTURE: CPT

## 2022-04-14 PROCEDURE — 93458 L HRT ARTERY/VENTRICLE ANGIO: CPT

## 2022-04-14 PROCEDURE — B2111ZZ FLUOROSCOPY OF MULTIPLE CORONARY ARTERIES USING LOW OSMOLAR CONTRAST: ICD-10-PCS

## 2022-04-14 PROCEDURE — 93005 ELECTROCARDIOGRAM TRACING: CPT

## 2022-04-14 PROCEDURE — X2CY3T7 EXTIRPATION OF MATTER FROM GREAT VESSEL USING COMPUTER-AIDED MECHANICAL ASPIRATION, PERCUTANEOUS APPROACH, NEW TECHNOLOGY GROUP 7: ICD-10-PCS

## 2022-04-14 PROCEDURE — 027034Z DILATION OF CORONARY ARTERY, ONE ARTERY WITH DRUG-ELUTING INTRALUMINAL DEVICE, PERCUTANEOUS APPROACH: ICD-10-PCS

## 2022-04-14 PROCEDURE — 99291 CRITICAL CARE FIRST HOUR: CPT

## 2022-04-14 RX ADMIN — METOPROLOL TARTRATE SCH: 25 TABLET, FILM COATED ORAL at 12:12

## 2022-04-14 RX ADMIN — HYDROMORPHONE HYDROCHLORIDE PRN MG: 1 INJECTION, SOLUTION INTRAMUSCULAR; INTRAVENOUS; SUBCUTANEOUS at 20:55

## 2022-04-14 RX ADMIN — TIROFIBAN SCH MLS/HR: 5 INJECTION, SOLUTION INTRAVENOUS at 17:06

## 2022-04-14 RX ADMIN — HEPARIN SODIUM ONE UNIT: 1000 INJECTION, SOLUTION INTRAVENOUS; SUBCUTANEOUS at 09:26

## 2022-04-14 RX ADMIN — CLOPIDOGREL BISULFATE SCH MG: 75 TABLET ORAL at 12:22

## 2022-04-14 RX ADMIN — RANOLAZINE SCH MG: 500 TABLET, FILM COATED, EXTENDED RELEASE ORAL at 20:55

## 2022-04-14 RX ADMIN — HEPARIN SODIUM ONE UNIT: 1000 INJECTION, SOLUTION INTRAVENOUS; SUBCUTANEOUS at 08:50

## 2022-04-14 RX ADMIN — HEPARIN SODIUM ONE UNIT: 1000 INJECTION, SOLUTION INTRAVENOUS; SUBCUTANEOUS at 09:07

## 2022-04-14 RX ADMIN — NITROGLYCERIN PRN INCH: 20 OINTMENT TOPICAL at 13:35

## 2022-04-14 RX ADMIN — TIROFIBAN SCH MLS/HR: 5 INJECTION, SOLUTION INTRAVENOUS at 12:22

## 2022-04-14 RX ADMIN — HYDROMORPHONE HYDROCHLORIDE PRN MG: 1 INJECTION, SOLUTION INTRAMUSCULAR; INTRAVENOUS; SUBCUTANEOUS at 17:00

## 2022-04-14 RX ADMIN — METOPROLOL TARTRATE SCH MG: 25 TABLET, FILM COATED ORAL at 20:55

## 2022-04-14 NOTE — CC
CARDIAC CATHETERIZATION REPORT



DATE OF SERVICE:

04/14/2022.



PROCEDURE:

1. Left heart catheterization, coronary angiography and selective injection of 
bypass

    graft.

2. Mechanical thrombectomy with Penumbra device and manual thrombectomy with 
EXPORT catheter

3. Percutaneous transluminal coronary angioplasty and stenting of a totally 
occluded

    vein graft to the RCA performed in the setting of a subacute inferior ST-
elevation

    myocardial infarction with chest pain of about 18 hours' duration.



PERFORMED BY:

Dr. MARY Ott.



Moderate conscious sedation time was 104 minutes.  Patient was administered 
Versed.

Oxygen, hemodynamics and EKG were monitored closely.



CLINICAL INFORMATION:

Mr. Thierno Hoang is a 61-year-old gentleman with history of hypertension,

hyperlipidemia and CAD.  In 2003 he underwent aortocoronary bypass surgery with 
a LIMA

to LAD, vein graft to the RCA, and a free radial artery graft to the obtuse 
marginal

branch of circumflex.  In 2018 he had a cardiac catheterization which revealed 
that the

grafts were patent, but there was somewhat sluggish flow in the vein graft to 
the RCA;

but the vein graft was very large, almost 6 mm in size.  There was no 
significant

disease in the LIMA or the free radial artery graft to the obtuse marginal.  The
native

RCA was occluded. LAD was occluded after the septal branch and circumflex also 
was

totally occluded after the obtuse marginal branch.  He was advised medical 
therapy at

that time.



The patient presented to the hospital with a pain from nearly 12 noon yesterday 
of

about 18 to 20 hours' duration approximately.  Pain was constant, persistent, on
and

off and progressively got worse.  He had Q-waves in the inferior leads with mild
ST-

elevation.  This was almost a subacute MI with post-infarction angina.  He was 
advised

cardiac catheterization and intervention that was performed expeditiously.



PROCEDURE NOTE:

Under local anesthesia and strict aseptic precautions, a 6-Mongolian introducer was
placed

in the right femoral artery.  I started off with a right Lg guide catheter 
and

noted that the RCA was totally occluded, and the vein graft to the RCA was the 
culprit

lesion with total occlusion filled with thrombus.  I performed intervention 
without

success.  Multiple stents were deployed and I performed mechanical atherectomy 
with a

Penumbra device and also performed manual atherectomy with an Tilden catheter 
and an

ASAP catheter.  Following this, I deployed two stents in a vein graft. Both of 
these

were 4.5 mm bare metal stents. Distally within the native vessel I deployed a 
3.25

caliber 15 mm Xience drug-eluting stent.  Patient was placed on IV heparin and 
ACT was

kept between 200 and 250, and also he received tirofiban bolus infusion.  In 
spite of

multiple efforts with mechanical as well as manual thrombectomy and Integrilin 
and

heparin, his vessel remained occluded.  On reviewing the previous angiograms, 
vessel

was a 6 mm vessel.  This was an unsuccessful PCI. Details were discussed with 
the

patient.  I also spoke to the patient's wife by phone.  Patient was eventually 
sent to

the room after securing hemostasis in the right groin with an Angio-Seal device.



Following the intervention procedure, I performed cardiac catheterization. Using
the

same catheter, I performed selective injection of the LIMA as well as the free 
radial

artery graft to the obtuse marginal.  I used a standard left guide catheter to 
perform

selective injection of the left coronary artery, and the right catheter was used
to

check LV pressures.  LV gram was not performed.



CARDIAC CATHETERIZATION FINDINGS:

The left ventricular end-diastolic pressure was about 15 mmHg without a gradient
across

the aortic valve.



CORONARY ANGIOGRAPHY FINDINGS:

RIGHT CORONARY ARTERY: Totally occluded in the proximal portion.



SVG TO RCA: This is totally occluded in the proximal portion, filled with 
thrombus.



FREE RADIAL ARTERY GRAFT TO THE OBTUSE MARGINAL BRANCH OF CIRCUMFLEX: This graft
is

widely patent in its origin, course and insertion site, and the opacified obtuse

marginal is free of significant disease.



LEFT INTERNAL MAMMARY ARTERY GRAFT TO LAD: This graft is widely patent with good
flow

in the LAD.  No significant disease and LAD has minor diffuse irregularities.



LEFT MAIN CORONARY ARTERY: Short, patent, disease-free vessel that bifurcates 
into LAD

and circumflex.



LEFT ANTERIOR DESCENDING CORONARY ARTERY: This is occluded in the mid portion 
after a

diagonal branch.  It appears that the free radial artery graft is actually 
attached to

the diagonal, not the obtuse marginal branch.



CIRCUMFLEX CORONARY ARTERY: This vessel is totally occluded after a small obtuse

marginal branch without much antegrade flow.



FINAL IMPRESSION:

This patient has total occlusion of mid LAD and proximal/mid circumflex.  The 
RCA is

also occluded in the proximal portion.  The vein graft to the RCA is the culprit

lesion, which is totally occluded, filled with thrombus.  The free radial artery
graft

is actually attached to the major diagonal branch, not the obtuse marginal.  
This is

widely patent with good flow.  LIMA to LAD is also widely patent with good flow.

Filling pressures are mildly elevated and there was no gradient across aortic 
valve.



RECOMMENDATIONS:

I have advised PCI of RCA graft, which was a vein graft.  The procedure was 
performed

but was unsuccessful after multiple attempts. Two stents were deployed in the 
native

RCA and I also post-dilated with a 5.0 caliber NC Trek balloon, without much 
success.



This was an unsuccessful procedure. Details were discussed with the patient as 
well as

with his wife by phone.  He will be sent to the ICU and we will watch him very 
closely.

Prognosis remains guarded.  Echo will be obtained tomorrow or later today.





MMODL / IJN: [id number] / Job#: [id number]

CYNTHIA

## 2022-04-14 NOTE — XR
EXAMINATION TYPE: XR chest 1V portable

 

DATE OF EXAM: 4/14/2022

 

Comparison: 7/7/2018

 

Clinical History: 61-year-old male chest pain

 

Findings:

Median sternotomy wires and post-CABG clips in the mediastinum. Heart mildly enlarged. Mild hyperinfl
ation. Some strandy atelectasis in the lower lungs. Hazy density relating to overlying soft tissue an
d large body habitus. Otherwise, no consolidation or pleural effusion.

 

 

Impression:

 

1. Mild cardiomegaly and COPD. Post-CABG changes.

2. Some strandy basilar atelectasis. No definite acute process.

## 2022-04-14 NOTE — ECHOF
Referral Reason:STEMI



MEASUREMENTS

--------

HEIGHT: 175.3 cm

WEIGHT: 97.5 kg

BP: 130/84

RVIDd:   3.5 cm     (< 3.3)

IVSd:   1.5 cm     (0.6 - 1.1)

LVIDd:   4.7 cm     (3.9 - 5.3)

LVPWd:   1.2 cm     (0.6 - 1.1)

IVSs:   1.6 cm

LVIDs:   4.1 cm

LVPWs:   1.5 cm

LA Diam:   3.4 cm     (2.7 - 3.8)

Ao Diam:   3.7 cm     (2.0 - 3.7)

AV Cusp:   2.1 cm     (1.5 - 2.6)

MV EXCURSION:   18.742 mm     (> 18.000)

MV EF SLOPE:   72 mm/s     (70 - 150)

EPSS:   0.7 cm

MV E Amado:   0.71 m/s

MV DecT:   235 ms

MV A Amado:   0.70 m/s

MV E/A Ratio:   1.01 

RAP:   5.00 mmHg

RVSP:   24.64 mmHg







FINDINGS

--------

Sinus rhythm.

This was a technically difficult study with suboptimal views.

The left ventricular size is normal.   There is moderate concentric left ventricular hypertrophy.   O
verall left ventricular systolic function is mildly impaired with, an EF between 45 - 50 %.   Basal p
osterior LV wall motion is hypokinetic.    Basal inferior LV wall motion is hypokinetic.    Basal inf
eroseptal LV wall motion is dyskinetic.   Mid inferior LV wall motion is hypokinetic.

The right ventricle is mildly enlarged.

The left atrium is normal in size.

The right atrium is normal in size.

5 ml of Lumason was utilized for enhancement of images.

Interatrial and interventricular septum intact.

The aortic valve is trileaflet, and appears structurally normal. No aortic stenosis or regurgitation.


Mild mitral annular calcification present.

Mild tricuspid regurgitation present.   Right ventricular systolic pressure is normal at < 35 mmHg.

The pulmonic valve is normal.

The aortic root size is normal.

Normal inferior vena cava with normal inspiratory collapse consistent with estimated right atrial pre
ssure of  5 mmHg.

Echo free space indicative of a pericardial fat pad.   There is no pericardial effusion.



CONCLUSIONS

--------

1. The left ventricular size is normal.

2. There is moderate concentric left ventricular hypertrophy.

3. Overall left ventricular systolic function is mildly impaired with, an EF between 45 - 50 %.

4. Basal posterior LV wall motion is hypokinetic.

5. Basal inferior LV wall motion is hypokinetic.

6. Basal inferoseptal LV wall motion is dyskinetic.

7. Mid inferior LV wall motion is hypokinetic.

8. The right ventricle is mildly enlarged.

9. 5 ml of Lumason was utilized for enhancement of images.

10. Mild mitral annular calcification present.

11. Mild tricuspid regurgitation present.

12. Echo free space indicative of a pericardial fat pad.

13. There is no pericardial effusion.





SONOGRAPHER: Rody Cervantes RDCS

## 2022-04-14 NOTE — P.HPIM
History of Present Illness


H&P Date: 04/14/22


Chief Complaint: Chest pain





This is a pleasant 61-year-old patient who follows a Dr. Jeri Cespedes.  Chronic 

stable medical conditions include hypertension, hyperlipidemia, anxiety, gout 

for which patient takes allopurinol.  Patient has a known history of coronary 

artery bypass in 2003.  Patient had a cardiac catheterization in 2018.  Was 

found to have chronically occluded vessels.  Medical management was done.  

Patient has cotton used to taking medications when necessary for his angina.


Yesterday patient started having episodes of significant perspiration.  Took his

nitroglycerin with some help.  With the chest pain persisted.  Felt like a 

heaviness all the time.  No radiation.  Tired rundown.  Since patient gets pain 

all the time she was not sure oftentimes what different to do.  This morning 

patient's troponin peaked at 21.  ST elevation in inferior leads.  Patient was 

taken to the cath lab by Dr. MELQUIADES Ott.  Intervention in terms of angioplasty 

stenting was unsuccessful.  More details in his records.  He did speak to the 

patient's wife.


Patient's currently in the ICU.  Still having some pain.  Nitro paste has been 

ordered.








Review of systems:


GEN.:  Tired


EYES: None


HEENT: None


NECK: None


RESPIRATORY: None


CARDIOVASCULAR: As above


GASTROINTESTINAL: None


GENITOURINARY: None


MUSCULOSKELETAL: Some joint pains


LYMPHATICS: None


HEMATOLOGICAL: None  


PSYCHIATRY: None


NEUROLOGICAL: None








Past medical history to include:


CAD with stent/bypass, hypertension, hyperlipidemia, gout





Social history:


No smoking.  Drinks about 6-8 beers twice a week.  Does one or 2 joints of 

marijuana every day.  .  .





Family history:


Reviewed, noncontributory to presentation





Physical examination:


VITAL SIGNS: 98, 76, 16, 110/74, 96% room air]


GENERAL: BMI 31.7, declining bed, awake not in distress.


EYES: Pupils equal.  Conjunctiva normal.


HEENT: External appearance of nose and ears normal, oral cavity grossly normal.


NECK: JVD not raised; masses not palpable.


HEART: First and second heart sounds are normal;  no edema.  


LUNGS: Respiratory rate normal; clear to auscultation.  


ABDOMEN: Soft,  nontender, liver spleen not palpable, no masses palpable.  


PSYCH: Alert and oriented x3;  mood  and affect normal.  


MUSCULOSKELETAL:No Clubbing/cyanosis;muscles-grossly intact.


NEUROLOGICAL: Cranial nerves grossly intact; no facial asymmetry,   power and 

sensation grossly intact. 


LYMPHATICS: No lymph nodes palpable in the axilla and neck





INVESTIGATIONS, reviewed in the clinical context:


White count 15.5 hemoglobin 15.6 platelets 295 sodium 135 potassium 3.9 

creatinine 0.83


Total bilirubin 1.4  ALT 51


 troponin I less than 0.012, 21.3


LDL 54


EKG tracing personally reviewed by me-ST elevation inferiorly sinus rhythm


Chest x-ray film personally reviewed by me-post bypass changes.  No obvious 

infiltrate





Assessment and plan:





-Acute ST elevation myocardial infarction of the inferior wall


Unsuccessful coronary intervention.  For supportive care.  Follow with 

cardiology





-Post infarct angina


Nitropaste.  Ranexa 5 mg every 12





-CAD with a prior history of bypass in 2003.  Patient has chronically occluded 

vessels.


Aspirin 81 mg daily, Lipitor 80 mg daily, Plavix) 5 mg daily.  Imdur 30 mg 

daily.  Zestril 10 mg day.  Lopressor 25 mg twice a day





-Recreational marijuana use





-Obesity BMI 31.7


Weight loss measures





-Essential hypertension


Lopressor 25 mg twice a day





-Hyperlipidemia


Lipitor 80 mg daily





-Chronic gout


Allopurinol 300 mg daily





 Patient's home medications resumed.  Nitropaste added.  Telemetry.  Care was 

discussed with the patient and the wife.  At further questions about any other 

options.  I will have them discuss this with the cardiologist.


Given the complexity and severity of patient's condition expect the patient to 

be in the hospital at least for 2 overnights

















Past Medical History


Past Medical History: Coronary Artery Disease (CAD), Hyperlipidemia, 

Hypertension


Additional Past Medical History / Comment(s): Triple bypass


Last Myocardial Infarction Date:: 2003


History of Any Multi-Drug Resistant Organisms: None Reported


Past Surgical History: Heart Catheterization With Stent, Hernia Repair, 

Orthopedic Surgery


Past Anesthesia/Blood Transfusion Reactions: No Reported Reaction


Date of Last Stent Placement:: 2013


Past Psychological History: Anxiety


Smoking Status: Never smoker


Past Alcohol Use History: Daily


Past Drug Use History: Marijuana





Medications and Allergies


                                Home Medications











 Medication  Instructions  Recorded  Confirmed  Type


 


Atorvastatin [Lipitor] 80 mg PO DAILY 07/07/18 04/14/22 History


 


Ergocalciferol (Vitamin D2) 50,000 unit PO TU 07/07/18 04/14/22 History





[Vitamin D2]    


 


allopurinoL [Zyloprim] 300 mg PO DAILY 07/07/18 04/14/22 History


 


Aspirin 81 mg PO DAILY #1 chewable 07/09/18 04/14/22 Rx


 


Metoprolol Tartrate [Lopressor] 25 mg PO BID #60 tab 07/09/18 04/14/22 Rx


 


Nitroglycerin Sl Tabs [Nitrostat] 0.4 mg SUBLINGUAL Q5M PRN #25 tab 07/09/18 04/14/22 Rx


 


Ranolazine [Ranexa] 500 mg PO Q12HR #60 tab.er.12h 07/09/18 04/14/22 Rx


 


Thiamine [Vitamin B-1] 100 mg PO DAILY 07/09/18 04/14/22 History


 


lisinopriL [Zestril] 10 mg PO DAILY 07/09/18 04/14/22 History


 


HYDROcodone/APAP 7.5-325MG [Norco 1 tab PO Q6H PRN 02/18/20 04/14/22 History





7.5-325]    


 


Isosorbide Mononitrate ER [Imdur] 30 mg PO DAILY 02/18/20 04/14/22 History


 


LORazepam [Ativan] 1 mg PO BID PRN 02/18/20 04/14/22 History








                                    Allergies











Allergy/AdvReac Type Severity Reaction Status Date / Time


 


No Known Allergies Allergy   Verified 04/14/22 08:32














Physical Exam


Vitals: 


                                   Vital Signs











  Temp Pulse Pulse Resp BP Pulse Ox


 


 04/14/22 08:03    77   


 


 04/14/22 07:47  98 F  79   20  130/84  97








                                Intake and Output











 04/13/22 04/14/22 04/14/22





 22:59 06:59 14:59


 


Intake Total   720


 


Balance   720


 


Intake:   


 


  IV   720


 


Other:   


 


  Weight   97.522 kg














Results


CBC & Chem 7: 


                                 04/14/22 08:10





                                 04/14/22 08:10


Labs: 


                  Abnormal Lab Results - Last 24 Hours (Table)











  04/14/22 04/14/22 04/14/22 Range/Units





  08:10 08:10 08:10 


 


WBC  15.5 H    (3.8-10.6)  k/uL


 


Neutrophils #  13.0 H    (1.3-7.7)  k/uL


 


Sodium   135 L   (137-145)  mmol/L


 


Glucose   133 H   (74-99)  mg/dL


 


Total Bilirubin   1.4 H   (0.2-1.3)  mg/dL


 


AST   355 H   (17-59)  U/L


 


ALT   51 H   (4-49)  U/L


 


Troponin I    21.300 H*  (0.000-0.034)  ng/mL

## 2022-04-14 NOTE — ED
General Adult HPI





- General


Chief complaint: Chest Pain


Stated complaint: Chest Pain


Time Seen by Provider: 04/14/22 08:00


Source: patient


Mode of arrival: ambulatory


Limitations: no limitations





- History of Present Illness


Initial comments: 


Dictation was produced using dragon dictation software. please excuse any 

grammatical, word or spelling errors. 











Chief Complaint: 61-year-old male past medical history of coronary artery 

disease presents to the emergency department for chest pain





History of Present Illness: Patient is a 61-year-old male who presents to the 

emergency department for chest pain.  Patient states that he started having 

chest pain yesterday.  He states that C pressure to his substernal area.  Does 

have some tingling in his left upper extremity.  He also has some paresthesias 

to his left jaw.  He had an episode of clamminess yesterday.  He took 2 nitro 

with slight improvement.  Overnight he continued to have chest pain until this 

morning.  He states that his pain is not severe though noticeable.  It resembles

his symptoms when he was diagnosed with a heart attack several years ago.  

Patient stated he had a recent cardiac catheterization that showed 70% blockage 

in one of the arteries.  At that time he was not intervened on.  Patient states 

he had does have some active chest pain ongoing at this time.  He had a CABG in 

the early 2000s.  His cardiologist is Dr. Ott.








The ROS documented in this emergency department record has been reviewed and 

confirmed by me.  Those systems with pertinent positive or negative responses 

have been documented in the HPI.  All other systems are other negative and/or 

noncontributory.








PHYSICAL EXAM:


General Impression: Alert and oriented x3, not in acute distress


HEENT: Normocephalic atraumatic, extra-ocular movements intact, pupils equal and

reactive to light bilaterally, mucous membranes moist.


Cardiovascular: Heart regular rate and rhythm


Chest: Able to complete full sentences, no retractions, no tachypnea


Abdomen: abdomen soft, non-tender, non-distended, no organomegaly


Musculoskeletal: Pulses present and equal in all extremities, no peripheral 

edema


Motor:  no focal deficits noted


Neurological: CN II-XII grossly intact, no focal motor or sensory deficits noted


Skin: Intact with no visualized rashes


Psych: Normal affect and mood





ED course: 61-year-old male presents emergency department for chest pain.  Vital

signs upon arrival are within acceptable limits.  EKG performed at the bedside 

shows ST elevations in the inferior leads with reciprocal changes in the high 

lateral leads.  EKG consistent with ST segment elevation MI.  Code STEMI paged. 

Cardiology was down at the bedside evaluated patient and EKG.  Patient will be 

dispositioned to heart catheterization lab.  Patient admitted to Dr. Rodas.








EKG interpretation: Ventricular rate 75, sinus rhythm,.  LA Interval 143, QS 94,

. No LA prolongation, no QTC prolongation.  ST elevations in inferior 

leads with reciprocal changes and high lateral leads..  EKG compared to 

02/05/2020 showing no changes.  Overall, this EKG is consistent with ST segment 

elevation MI.











- Related Data


                                Home Medications











 Medication  Instructions  Recorded  Confirmed


 


Atorvastatin [Lipitor] 80 mg PO DAILY 07/07/18 04/14/22


 


Ergocalciferol (Vitamin D2) 50,000 unit PO TU 07/07/18 02/20/20





[Vitamin D2]   


 


allopurinoL [Zyloprim] 300 mg PO DAILY 07/07/18 04/14/22


 


Thiamine [Vitamin B-1] 100 mg PO DAILY 07/09/18 04/14/22


 


lisinopriL [Zestril] 10 mg PO DAILY 07/09/18 04/14/22


 


HYDROcodone/APAP 7.5-325MG [Norco 1 tab PO Q6H PRN 02/18/20 04/14/22





7.5-325]   


 


Isosorbide Mononitrate ER [Imdur] 30 mg PO DAILY 02/18/20 04/14/22


 


LORazepam [Ativan] 1 mg PO BID PRN 02/18/20 04/14/22








                                  Previous Rx's











 Medication  Instructions  Recorded


 


Aspirin 81 mg PO DAILY #1 chewable 07/09/18


 


Metoprolol Tartrate [Lopressor] 25 mg PO BID #60 tab 07/09/18


 


Nitroglycerin Sl Tabs [Nitrostat] 0.4 mg SUBLINGUAL Q5M PRN #25 tab 07/09/18


 


Ranolazine [Ranexa] 500 mg PO Q12HR #60 tab.er.12h 07/09/18











                                    Allergies











Allergy/AdvReac Type Severity Reaction Status Date / Time


 


No Known Allergies Allergy   Verified 04/14/22 08:32














Review of Systems


ROS Statement: 


Those systems with pertinent positive or pertinent negative responses have been 

documented in the HPI.





ROS Other: All systems not noted in ROS Statement are negative.





Past Medical History


Past Medical History: Coronary Artery Disease (CAD), Hyperlipidemia, 

Hypertension


Additional Past Medical History / Comment(s): Triple bypass


Last Myocardial Infarction Date:: 2003


History of Any Multi-Drug Resistant Organisms: None Reported


Past Surgical History: Heart Catheterization With Stent, Hernia Repair, 

Orthopedic Surgery


Past Anesthesia/Blood Transfusion Reactions: No Reported Reaction


Date of Last Stent Placement:: 2013


Past Psychological History: Anxiety


Smoking Status: Never smoker


Past Alcohol Use History: Daily


Past Drug Use History: Marijuana





General Exam


Limitations: no limitations





Course


                                   Vital Signs











  04/14/22 04/14/22





  07:47 08:03


 


Temperature 98 F 


 


Pulse Rate 79 


 


Pulse Rate [  77





Sitting Cardiac  





Monitor]  


 


Respiratory 20 





Rate  


 


Blood Pressure 130/84 


 


O2 Sat by Pulse 97 





Oximetry  














Medical Decision Making





- Lab Data


Result diagrams: 


                                 04/14/22 08:10





                                 04/14/22 08:10


                                   Lab Results











  04/14/22 04/14/22 Range/Units





  08:10 08:10 


 


WBC  15.5 H   (3.8-10.6)  k/uL


 


RBC  4.91   (4.30-5.90)  m/uL


 


Hgb  15.6   (13.0-17.5)  gm/dL


 


Hct  45.1   (39.0-53.0)  %


 


MCV  92.0   (80.0-100.0)  fL


 


MCH  31.8   (25.0-35.0)  pg


 


MCHC  34.5   (31.0-37.0)  g/dL


 


RDW  13.1   (11.5-15.5)  %


 


Plt Count  295   (150-450)  k/uL


 


MPV  7.2   


 


Neutrophils %  84   %


 


Lymphocytes %  8   %


 


Monocytes %  6   %


 


Eosinophils %  0   %


 


Basophils %  0   %


 


Neutrophils #  13.0 H   (1.3-7.7)  k/uL


 


Lymphocytes #  1.3   (1.0-4.8)  k/uL


 


Monocytes #  0.9   (0-1.0)  k/uL


 


Eosinophils #  0.0   (0-0.7)  k/uL


 


Basophils #  0.0   (0-0.2)  k/uL


 


Sodium   135 L  (137-145)  mmol/L


 


Potassium   3.9  (3.5-5.1)  mmol/L


 


Chloride   103  ()  mmol/L


 


Carbon Dioxide   24  (22-30)  mmol/L


 


Anion Gap   8  mmol/L


 


BUN   12  (9-20)  mg/dL


 


Creatinine   0.83  (0.66-1.25)  mg/dL


 


Est GFR (CKD-EPI)AfAm   >90  (>60 ml/min/1.73 sqM)  


 


Est GFR (CKD-EPI)NonAf   >90  (>60 ml/min/1.73 sqM)  


 


Glucose   133 H  (74-99)  mg/dL


 


Calcium   9.2  (8.4-10.2)  mg/dL


 


Total Bilirubin   1.4 H  (0.2-1.3)  mg/dL


 


AST   355 H  (17-59)  U/L


 


ALT   51 H  (4-49)  U/L


 


Alkaline Phosphatase   68  ()  U/L


 


Total Protein   7.5  (6.3-8.2)  g/dL


 


Albumin   4.3  (3.5-5.0)  g/dL














Critical Care Time


Critical Care Time: Yes


Total Critical Care Time: 33





Disposition


Clinical Impression: 


 STEMI (ST elevation myocardial infarction)





Disposition: ADMITTED AS IP TO THIS [facility]


Condition: Critical


Referrals: 


Jeri Cespedes DO [Primary Care Provider] - 1-2 days


Decision Time: 08:35

## 2022-04-14 NOTE — P.CRDCN
History of Present Illness


History of present illness: 


HISTORY OF PRESENTING ILLNESS


This is a pleasant 61-year-old male past medical history significant for 

coronary artery disease status post CABG in 2003, PCI in 2013, hypertension, 

dyslipidemia.  He follows in the office with Dr. Summers. We have been asked to 

see in consultation for chest pain, STEMI. Patient presents emergency department

with ongoing chest discomfort.  He states he his chest discomfort began 

yesterday at noon.  He states he was doing his normal daily activities of episod

e of diaphoresis and then began to have upper chest pain with radiation to his 

left jaw and left arm. He took 2 nitro with improvement. He continues to have 

dull chest discomfort, through this morning.  Presented to the emergency 

department for further evaluation. He denies associated shortness of breath, 

nausea, or vomiting. He was lightheaded. Denied any syncope or near syncope. 

Since his last cardiac catheterization in 2018 patient was doing well.  He does 

state he drinks alcohol about 3 times per week. Denies tobacco use EKG in the ER

revealed sinus rhythm, heart rate 75, some mild ST elevation in leads III and 

aVF, with some reciprocal changes, possibly related to old myocardial infarct. 

IV 4,000units of heparin given and aspirin 325mg given. 





DIAGNOSTICS


Chest xray COPD, mild cardiomegaly


Laboratory reviewed, WBC 15.5 2015.6, platelets 295, sodium 135, potassium 3.9, 

BUN 12, serum creatinine 0.8, , ALT 51, total bilirubin 1.4


Current home cardiac medications include lisinopril 10 mg daily, Ranexa, when 

necessary nitroglycerin, metoprolol titrate 25 mg twice a day, Imdur 30 mg 

daily, atorvastatin 80 mg daily, aspirin 81 mg daily


Most recent cardiac catheterization 07/2018 revealed LAD is totally occluded in 

the ostial portion, left main coronary artery is normally patent, diagonal 

branch with patent stent with 30% stenosis in the proximal portion, circumflex 

totally occluded RCA is totally occluded in its proximal portion, SVG to RCA is 

ectatic the large caliber graft with slightly sluggish flow, no hemodynamically 

significant stenosis noted.  Good filling of the PLV branch of the RCA, radial 

artery graft to the obtuse marginal branch is patent.  LIMA graft to LAD is 

patent.  Medical therapy was advised at this time.


Echocardiogram 2018 revealed EF 5560 percent, trace to mild MR, mild TR





REVIEW OF SYSTEMS


At the time of my exam:


CONSTITUTIONAL: Denies fever or chills.


CARDIOVASCULAR: + chest pain, Denies shortness of breath, orthopnea, PND or 

palpitations.


RESPIRATORY: Denies cough. 


GASTROINTESTINAL: Denies abdominal pain, diarrhea, constipation, nausea or 

vomiting.


MUSCULOSKELETAL: Denies myalgias.


NEUROLOGIC: Denies numbness, tingling, headacbe or weakness.


ENDOCRINE: Denies fatigue, weight change,  polydipsia or polyurina.


GENITOURINARY: Denies burning, hematuria or urgency with micturation.


HEMATOLOGIC: Denies history of anemia or bleeding. 





PHYSICAL EXAMINATION


Blood pressure 130/84, heart 79, afebrile, saturations 97% on room air


CONSTITUTIONAL: No apparent distress. 


HEENT: Head is normocephalic. Pupils are equal, round. Sclerae anicteric. Mucous

membranes of the mouth are moist.  No JVD. No carotid bruit.


CHEST EXAMINATION: Lungs are clear to auscultation. No chest wall tenderness is 

noted on palpation or with deep breathing. 


HEART EXAMINATION: Regular rate and rhythm. S1, S2 heard. No murmurs, gallops or

rub.


ABDOMEN: Soft, nontender. Positive bowel sounds.


EXTREMITIES: 2+ peripheral pulses, no lower extremity edema and no calf 

tenderness.


NEUROLOGIC EXAMINATION: Patient is awake, alert and oriented x3. 





ASSESSMENT


Chest pain, EKG changes in inferior leads, inferior STEMI


Coronary artery disease status post CABG in 2003, PCI in 2013


Hypertension


Dyslipidemia





PLAN


We will proceed with coronary angiography at this time. Patient is agreeable. 


I have discussed the risks, benefits and alternative therapies for the above-

mentioned procedure and for both sedation/analgesia as well as necessary blood 

product administration, if indicated, as they pertain to this patient.  The 

patient has indicated understanding and acceptance of the risks and procedures 

discussed.  Questions have been answered appropriately and he is agreeable to 

move forward with the above-stated procedure. 


Cardiac catheterization with Dr. Ott


Obtain 2D echocardiogram and doppler study to assess cardiac structure and 

function. 


Further recommendations based on clinical course





Nurse practitioner note has been reviewed by physician. Signing provider agrees 

with the documented findings, assessment, and plan of care. 














Past Medical History


Past Medical History: Coronary Artery Disease (CAD), Hyperlipidemia, 

Hypertension


Additional Past Medical History / Comment(s): Triple bypass


Last Myocardial Infarction Date:: 2003


History of Any Multi-Drug Resistant Organisms: None Reported


Past Surgical History: Heart Catheterization With Stent, Hernia Repair, 

Orthopedic Surgery


Past Anesthesia/Blood Transfusion Reactions: No Reported Reaction


Date of Last Stent Placement:: 2013


Past Psychological History: Anxiety


Smoking Status: Never smoker


Past Alcohol Use History: Daily


Past Drug Use History: Marijuana





Medications and Allergies


                                Home Medications











 Medication  Instructions  Recorded  Confirmed  Type


 


Atorvastatin [Lipitor] 80 mg PO DAILY 07/07/18 04/14/22 History


 


Ergocalciferol (Vitamin D2) 50,000 unit PO TU 07/07/18 04/14/22 History





[Vitamin D2]    


 


allopurinoL [Zyloprim] 300 mg PO DAILY 07/07/18 04/14/22 History


 


Aspirin 81 mg PO DAILY #1 chewable 07/09/18 04/14/22 Rx


 


Metoprolol Tartrate [Lopressor] 25 mg PO BID #60 tab 07/09/18 04/14/22 Rx


 


Nitroglycerin Sl Tabs [Nitrostat] 0.4 mg SUBLINGUAL Q5M PRN #25 tab 07/09/18 04/14/22 Rx


 


Ranolazine [Ranexa] 500 mg PO Q12HR #60 tab.er.12h 07/09/18 04/14/22 Rx


 


Thiamine [Vitamin B-1] 100 mg PO DAILY 07/09/18 04/14/22 History


 


lisinopriL [Zestril] 10 mg PO DAILY 07/09/18 04/14/22 History


 


HYDROcodone/APAP 7.5-325MG [Norco 1 tab PO Q6H PRN 02/18/20 04/14/22 History





7.5-325]    


 


Isosorbide Mononitrate ER [Imdur] 30 mg PO DAILY 02/18/20 04/14/22 History


 


LORazepam [Ativan] 1 mg PO BID PRN 02/18/20 04/14/22 History








                                    Allergies











Allergy/AdvReac Type Severity Reaction Status Date / Time


 


No Known Allergies Allergy   Verified 04/14/22 08:32














Physical Exam


Vitals: 


                                   Vital Signs











  Temp Pulse Pulse Resp BP Pulse Ox


 


 04/14/22 08:03    77   


 


 04/14/22 07:47  98 F  79   20  130/84  97








                                Intake and Output











 04/13/22 04/14/22 04/14/22





 22:59 06:59 14:59


 


Other:   


 


  Weight   215 kg














Results





                                 04/14/22 08:10





                                 04/14/22 08:10


                                 Cardiac Enzymes











  04/14/22 Range/Units





  08:10 


 


AST  355 H  (17-59)  U/L








                                       CBC











  04/14/22 Range/Units





  08:10 


 


WBC  15.5 H  (3.8-10.6)  k/uL


 


RBC  4.91  (4.30-5.90)  m/uL


 


Hgb  15.6  (13.0-17.5)  gm/dL


 


Hct  45.1  (39.0-53.0)  %


 


Plt Count  295  (150-450)  k/uL








                          Comprehensive Metabolic Panel











  04/14/22 Range/Units





  08:10 


 


Sodium  135 L  (137-145)  mmol/L


 


Potassium  3.9  (3.5-5.1)  mmol/L


 


Chloride  103  ()  mmol/L


 


Carbon Dioxide  24  (22-30)  mmol/L


 


BUN  12  (9-20)  mg/dL


 


Creatinine  0.83  (0.66-1.25)  mg/dL


 


Glucose  133 H  (74-99)  mg/dL


 


Calcium  9.2  (8.4-10.2)  mg/dL


 


AST  355 H  (17-59)  U/L


 


ALT  51 H  (4-49)  U/L


 


Alkaline Phosphatase  68  ()  U/L


 


Total Protein  7.5  (6.3-8.2)  g/dL


 


Albumin  4.3  (3.5-5.0)  g/dL








                               Current Medications











Generic Name Dose Route Start Last Admin





  Trade Name Freq  PRN Reason Stop Dose Admin


 


Aspirin  81 mg  04/15/22 09:00 





  Aspirin 81 Mg  PO  





  DAILY formerly [facility]  


 


Atorvastatin Calcium  80 mg  04/15/22 09:00 





  Atorvastatin 80 Mg Tab  PO  





  DAILY formerly [facility]  


 


Heparin Sodium/Sodium Chloride  250 mls @ 10 mls/hr  04/14/22 08:15 





  25,000 unit/ Sodium Chloride  IV  





  .Q24H formerly [facility]  





  Protocol  





  4.651 UNITS/KG/HR  


 


Sodium Chloride  1,000 mls @ 20 mls/hr  04/14/22 08:30 





  Saline 0.9%  IV  





  .Q24H formerly [facility]  


 


Sodium Chloride  1,000 mls @ 75 mls/hr  04/14/22 08:35 





  Saline 0.9%  IV  04/14/22 21:54 





  .[id number] STA  


 


Lisinopril  10 mg  04/15/22 09:00 





  Lisinopril 10 Mg Tab  PO  





  DAILY formerly [facility]  


 


Metoprolol Tartrate  25 mg  04/14/22 16:00 





  Metoprolol Tartrate 25 Mg Tab  PO  





  BID JOYCELYN  


 


Naloxone HCl  0.2 mg  04/14/22 08:21 





  Naloxone 0.4 Mg/Ml 1 Ml Vial  IV  





  Q2M PRN  





  Opioid Reversal  








                                Intake and Output











 04/13/22 04/14/22 04/14/22





 22:59 06:59 14:59


 


Other:   


 


  Weight   215 kg








                                 Patient Weight











 04/15/22





 06:59


 


Weight 215 kg








                                        





                                 04/14/22 08:10 





                                 04/14/22 08:10

## 2022-04-15 LAB
ALBUMIN SERPL-MCNC: 4.1 G/DL (ref 3.5–5)
ALP SERPL-CCNC: 62 U/L (ref 38–126)
ALT SERPL-CCNC: 69 U/L (ref 4–49)
ANION GAP SERPL CALC-SCNC: 6 MMOL/L
AST SERPL-CCNC: 378 U/L (ref 17–59)
BASOPHILS # BLD AUTO: 0 K/UL (ref 0–0.2)
BASOPHILS NFR BLD AUTO: 0 %
BUN SERPL-SCNC: 8 MG/DL (ref 9–20)
CALCIUM SPEC-MCNC: 8.8 MG/DL (ref 8.4–10.2)
CHLORIDE SERPL-SCNC: 100 MMOL/L (ref 98–107)
CO2 SERPL-SCNC: 26 MMOL/L (ref 22–30)
EOSINOPHIL # BLD AUTO: 0.1 K/UL (ref 0–0.7)
EOSINOPHIL NFR BLD AUTO: 0 %
ERYTHROCYTE [DISTWIDTH] IN BLOOD BY AUTOMATED COUNT: 4.66 M/UL (ref 4.3–5.9)
ERYTHROCYTE [DISTWIDTH] IN BLOOD: 12.4 % (ref 11.5–15.5)
GLUCOSE SERPL-MCNC: 124 MG/DL (ref 74–99)
HCT VFR BLD AUTO: 43.9 % (ref 39–53)
HGB BLD-MCNC: 14.8 GM/DL (ref 13–17.5)
LYMPHOCYTES # SPEC AUTO: 0.8 K/UL (ref 1–4.8)
LYMPHOCYTES NFR SPEC AUTO: 5 %
MCH RBC QN AUTO: 31.8 PG (ref 25–35)
MCHC RBC AUTO-ENTMCNC: 33.7 G/DL (ref 31–37)
MCV RBC AUTO: 94.3 FL (ref 80–100)
MONOCYTES # BLD AUTO: 1.3 K/UL (ref 0–1)
MONOCYTES NFR BLD AUTO: 7 %
NEUTROPHILS # BLD AUTO: 15.2 K/UL (ref 1.3–7.7)
NEUTROPHILS NFR BLD AUTO: 86 %
PLATELET # BLD AUTO: 242 K/UL (ref 150–450)
POTASSIUM SERPL-SCNC: 4.1 MMOL/L (ref 3.5–5.1)
PROT SERPL-MCNC: 7.2 G/DL (ref 6.3–8.2)
SODIUM SERPL-SCNC: 132 MMOL/L (ref 137–145)
WBC # BLD AUTO: 17.7 K/UL (ref 3.8–10.6)

## 2022-04-15 RX ADMIN — CLOPIDOGREL BISULFATE SCH MG: 75 TABLET ORAL at 09:27

## 2022-04-15 RX ADMIN — HYDROMORPHONE HYDROCHLORIDE PRN MG: 1 INJECTION, SOLUTION INTRAMUSCULAR; INTRAVENOUS; SUBCUTANEOUS at 15:23

## 2022-04-15 RX ADMIN — METOPROLOL TARTRATE SCH MG: 25 TABLET, FILM COATED ORAL at 09:27

## 2022-04-15 RX ADMIN — FUROSEMIDE SCH MG: 10 INJECTION, SOLUTION INTRAMUSCULAR; INTRAVENOUS at 09:28

## 2022-04-15 RX ADMIN — HYDROMORPHONE HYDROCHLORIDE PRN MG: 1 INJECTION, SOLUTION INTRAMUSCULAR; INTRAVENOUS; SUBCUTANEOUS at 06:15

## 2022-04-15 RX ADMIN — SPIRONOLACTONE SCH MG: 25 TABLET, FILM COATED ORAL at 09:28

## 2022-04-15 RX ADMIN — HEPARIN SODIUM SCH UNIT: 5000 INJECTION INTRAVENOUS; SUBCUTANEOUS at 21:04

## 2022-04-15 RX ADMIN — ISOSORBIDE MONONITRATE SCH MG: 30 TABLET, EXTENDED RELEASE ORAL at 09:27

## 2022-04-15 RX ADMIN — ATORVASTATIN CALCIUM SCH MG: 80 TABLET, FILM COATED ORAL at 09:27

## 2022-04-15 RX ADMIN — HEPARIN SODIUM SCH UNIT: 5000 INJECTION INTRAVENOUS; SUBCUTANEOUS at 09:26

## 2022-04-15 RX ADMIN — METOPROLOL TARTRATE SCH MG: 25 TABLET, FILM COATED ORAL at 21:04

## 2022-04-15 RX ADMIN — Medication SCH MG: at 09:26

## 2022-04-15 RX ADMIN — RANOLAZINE SCH MG: 500 TABLET, FILM COATED, EXTENDED RELEASE ORAL at 09:28

## 2022-04-15 RX ADMIN — HYDROMORPHONE HYDROCHLORIDE PRN MG: 1 INJECTION, SOLUTION INTRAMUSCULAR; INTRAVENOUS; SUBCUTANEOUS at 01:09

## 2022-04-15 RX ADMIN — RANOLAZINE SCH MG: 500 TABLET, FILM COATED, EXTENDED RELEASE ORAL at 21:04

## 2022-04-15 RX ADMIN — COLCHICINE SCH MG: 0.6 TABLET, FILM COATED ORAL at 21:05

## 2022-04-15 RX ADMIN — FUROSEMIDE SCH MG: 10 INJECTION, SOLUTION INTRAMUSCULAR; INTRAVENOUS at 21:04

## 2022-04-15 RX ADMIN — ASPIRIN 81 MG CHEWABLE TABLET SCH MG: 81 TABLET CHEWABLE at 09:27

## 2022-04-15 RX ADMIN — HYDROMORPHONE HYDROCHLORIDE PRN MG: 1 INJECTION, SOLUTION INTRAMUSCULAR; INTRAVENOUS; SUBCUTANEOUS at 09:18

## 2022-04-15 NOTE — PN
PROGRESS NOTE



Mr. Hoang is an unfortunate 61-year-old gentleman who underwent aortocoronary

bypass surgery in 2003 and presented yesterday with an inferior MI almost 18 hours

after his initial presentation.  His chest pain started around noontime on Wednesday

and he came at about 7:30 in the morning on Thursday, yesterday.  However, he has a

vein graft to the PDA branch of RCA which is a very large vein graft with mismatch

filled with thrombus.  I did both mechanical and manual thrombectomy, but I could not

open the vessel.  I deployed two stents as well, without success.  He therefore had a

complete myocardial infarction.  Unfortunately we could not open the vessel.  His

arrival troponin was 21, suggesting that he probably infarcted already.  He had chest

pain yesterday requiring Dilaudid.  This morning his pain is better.  He has shortness

of breath.  Ejection fraction is in the 45% range with inferior wall hypokinesia.  No

significant mitral regurgitation.  Physical exam revealed blood pressure 128/70, pulse

rate about 94 per minute. JVD is not evident. S1-S2 heard normally.  There is a short

systolic murmur.  Lungs reveal bilateral fine rales. Abdomen is soft, nontender.  Lower

extremities reveal diminished pulses.  Central nervous system is normal.



IMPRESSION:

1. Acute inferior wall myocardial infarction.  Patient came late, and his vessel could

    not be opened up.

2. History of coronary artery disease with prior bypass surgery.

3. Hypertension.

4. Hyperlipidemia.



RECOMMENDATIONS:

I will give him Lasix 20 mg q.12 hours, Aldactone 12.5 mg daily, check a CBC and BMP

and based on clinical course, will make further recommendations.  He will be on Lipitor

80 mg daily, lisinopril and also Ranexa.  Prognosis remains guarded.





MMODL / IJN: [id number] / Job#: [id number]

## 2022-04-15 NOTE — P.PN
Progress Note - Text


Progress Note Date: 04/15/22





Chief Complaint: Chest pain





This is a pleasant 61-year-old patient who follows a Dr. Jeri Cespedes.  Chronic 

stable medical conditions include hypertension, hyperlipidemia, anxiety, gout 

for which patient takes allopurinol.  Patient has a known history of coronary 

artery bypass in 2003.  Patient had a cardiac catheterization in 2018.  Was 

found to have chronically occluded vessels.  Medical management was done.  

Patient has cotton used to taking medications when necessary for his angina.


Yesterday patient started having episodes of significant perspiration.  Took his

nitroglycerin with some help.  With the chest pain persisted.  Felt like a 

heaviness all the time.  No radiation.  Tired rundown.  Since patient gets pain 

all the time she was not sure oftentimes what different to do.  This morning 

patient's troponin peaked at 21.  ST elevation in inferior leads.  Patient was 

taken to the cath lab by Dr. MELQUIADES Ott.  Intervention in terms of angioplasty 

stenting was unsuccessful.  More details in his records.  He did speak to the 

patient's wife.


Patient's currently in the ICU.  Still having some pain.  Nitro paste has been 

ordered.


April 15: ICU: Sitting up in bed.  Still having some chest pain.  On Nitropaste.

 Oral intake fair.  Being followed by cardiology.








Active Medications





Allopurinol (Allopurinol 300 Mg Tab)  300 mg PO DAILY [facility]


   Last Admin: 04/15/22 09:28 Dose:  300 mg


   Documented by: 


Aspirin (Aspirin 81 Mg)  81 mg PO DAILY [facility]


   Last Admin: 04/15/22 09:27 Dose:  81 mg


   Documented by: 


Atorvastatin Calcium (Atorvastatin 80 Mg Tab)  80 mg PO DAILY [facility]


   Last Admin: 04/15/22 09:27 Dose:  80 mg


   Documented by: 


Clopidogrel Bisulfate (Clopidogrel 75 Mg Tab)  75 mg PO DAILY [facility]


   Last Admin: 04/15/22 09:27 Dose:  75 mg


   Documented by: 


Colchicine (Colchicine 0.6 Mg Each)  0.6 mg PO BID [facility]


Furosemide (Furosemide 10 Mg/Ml 2 Ml Vial)  20 mg IV Q12HR [facility]


   Last Admin: 04/15/22 09:28 Dose:  20 mg


   Documented by: 


Heparin Sodium (Porcine) (Heparin Sodium,Porcine/Pf 5,000 Unit/0.5 Ml Syringe)  

5,000 unit SQ Q12HR [facility]


   Last Admin: 04/15/22 09:26 Dose:  5,000 unit


   Documented by: 


Hydromorphone HCl (Hydromorphone 0.5 Mg/0.5 Ml Syringe)  0.5 mg IVP Q6HR PRN


   PRN Reason: Pain


   Last Admin: 04/15/22 15:23 Dose:  0.5 mg


   Documented by: 


Isosorbide Mononitrate (Isosorbide Mononitrate Er 30 Mg Tab.Er.24h)  30 mg PO 

DAILY [facility]


   Last Admin: 04/15/22 09:27 Dose:  30 mg


   Documented by: 


Lisinopril (Lisinopril 10 Mg Tab)  10 mg PO DAILY [facility]


   Last Admin: 04/15/22 09:26 Dose:  10 mg


   Documented by: 


Metoprolol Tartrate (Metoprolol Tartrate 25 Mg Tab)  25 mg PO BID [facility]


   Last Admin: 04/15/22 09:27 Dose:  25 mg


   Documented by: 


Naloxone HCl (Naloxone 0.4 Mg/Ml 1 Ml Vial)  0.2 mg IV Q2M PRN


   PRN Reason: Opioid Reversal


Nitroglycerin (Nitroglycerin Oint 1 Inch/Gm Packet)  0.5 inch TOPICAL Q6HR PRN


   PRN Reason: Chest Pain


   Last Admin: 04/14/22 13:35 Dose:  0.5 inch


   Documented by: 


Ranolazine (Ranolazine 500 Mg Tab.Er.12h)  500 mg PO Q12HR [facility]


   Last Admin: 04/15/22 09:28 Dose:  500 mg


   Documented by: 


Spironolactone (Spironolactone 25 Mg Tab)  12.5 mg PO DAILY [facility]


   Last Admin: 04/15/22 09:28 Dose:  12.5 mg


   Documented by: 


Thiamine HCl (Thiamine 100 Mg Tab)  100 mg PO DAILY [facility]


   Last Admin: 04/15/22 09:26 Dose:  100 mg


   Documented by: 











Past medical history to include:


CAD with stent/bypass, hypertension, hyperlipidemia, gout





Social history:


No smoking.  Drinks about 6-8 beers twice a week.  Does one or 2 joints of 

marijuana every day.  .  .





Family history:


Reviewed, noncontributory to presentation





Physical examination:


VITAL SIGNS: 91, 16, 20, 104/65, 92% on 2 L


GENERAL: A declining in bed, awake not in distress.


EYES: Pupils equal.  Conjunctiva normal.


HEENT: External appearance of nose and ears normal, oral cavity grossly normal.


NECK: JVD not raised; masses not palpable.


HEART: First and second heart sounds are normal;  no edema.  


LUNGS: Respiratory rate normal; clear to auscultation.  


ABDOMEN: Soft,  nontender, liver spleen not palpable, no masses palpable.  


PSYCH: Alert and oriented x3;  mood  and affect normal.  


MUSCULOSKELETAL:No Clubbing/cyanosis;muscles-grossly intact.








INVESTIGATIONS, reviewed in the clinical context:


April 15: White count 7.7 hemoglobin 14.8 sodium 132 potassium 4.1 creatinine 

0.7  ALT 69


2-D echocardiogram: Moderate concentric LVH.  EF 45-50%.  Multiple wall motion 

abnormality.


White count 15.5 hemoglobin 15.6 platelets 295 sodium 135 potassium 3.9 

creatinine 0.83


Total bilirubin 1.4  ALT 51


 troponin I less than 0.012, 21.3


LDL 54


EKG tracing personally reviewed by me-ST elevation inferiorly sinus rhythm


Chest x-ray film personally reviewed by me-post bypass changes.  No obvious 

infiltrate





Assessment and plan:





-Acute ST elevation myocardial infarction of the inferior wall


Unsuccessful coronary intervention.  For supportive care.  Follow with 

cardiology





-Post infarct angina: Slow to respond


Nitropaste.  Ranexa 5 mg every 12.  Temperature





-CAD with a prior history of bypass in 2003.  Patient has chronically occluded 

vessels.


Aspirin 81 mg daily, Lipitor 80 mg daily, Plavix) 5 mg daily.  Imdur 30 mg 

daily.  Zestril 10 mg day.  Lopressor 25 mg twice a day





-Recreational marijuana use





-Obesity BMI 31.7


Weight loss measures





-Essential hypertension


Lopressor 25 mg twice a day





-Hyperlipidemia


Lipitor 80 mg daily





-Chronic gout


Allopurinol 300 mg daily





Continue current medication treatment plan.  Patient on Imdur and nitro paste.  

Lopressor.  For angina.  Discussed with patient.  Follow with cardiology.

## 2022-04-16 LAB
ANION GAP SERPL CALC-SCNC: 7 MMOL/L
ANION GAP SERPL CALC-SCNC: 9 MMOL/L
BASOPHILS # BLD AUTO: 0 K/UL (ref 0–0.2)
BASOPHILS NFR BLD AUTO: 0 %
BUN SERPL-SCNC: 12 MG/DL (ref 9–20)
BUN SERPL-SCNC: 17 MG/DL (ref 9–20)
CALCIUM SPEC-MCNC: 8.8 MG/DL (ref 8.4–10.2)
CALCIUM SPEC-MCNC: 8.9 MG/DL (ref 8.4–10.2)
CHLORIDE SERPL-SCNC: 93 MMOL/L (ref 98–107)
CHLORIDE SERPL-SCNC: 96 MMOL/L (ref 98–107)
CO2 SERPL-SCNC: 28 MMOL/L (ref 22–30)
CO2 SERPL-SCNC: 32 MMOL/L (ref 22–30)
EOSINOPHIL # BLD AUTO: 0 K/UL (ref 0–0.7)
EOSINOPHIL NFR BLD AUTO: 0 %
ERYTHROCYTE [DISTWIDTH] IN BLOOD BY AUTOMATED COUNT: 4.33 M/UL (ref 4.3–5.9)
ERYTHROCYTE [DISTWIDTH] IN BLOOD: 13.2 % (ref 11.5–15.5)
GLUCOSE SERPL-MCNC: 117 MG/DL (ref 74–99)
GLUCOSE SERPL-MCNC: 96 MG/DL (ref 74–99)
HCT VFR BLD AUTO: 40.2 % (ref 39–53)
HGB BLD-MCNC: 14.4 GM/DL (ref 13–17.5)
LYMPHOCYTES # SPEC AUTO: 1.4 K/UL (ref 1–4.8)
LYMPHOCYTES NFR SPEC AUTO: 9 %
MAGNESIUM SPEC-SCNC: 2.2 MG/DL (ref 1.6–2.3)
MCH RBC QN AUTO: 33.3 PG (ref 25–35)
MCHC RBC AUTO-ENTMCNC: 35.9 G/DL (ref 31–37)
MCV RBC AUTO: 92.8 FL (ref 80–100)
MONOCYTES # BLD AUTO: 1.1 K/UL (ref 0–1)
MONOCYTES NFR BLD AUTO: 7 %
NEUTROPHILS # BLD AUTO: 13.8 K/UL (ref 1.3–7.7)
NEUTROPHILS NFR BLD AUTO: 83 %
PLATELET # BLD AUTO: 258 K/UL (ref 150–450)
POTASSIUM SERPL-SCNC: 3.6 MMOL/L (ref 3.5–5.1)
POTASSIUM SERPL-SCNC: 3.9 MMOL/L (ref 3.5–5.1)
SODIUM SERPL-SCNC: 131 MMOL/L (ref 137–145)
SODIUM SERPL-SCNC: 134 MMOL/L (ref 137–145)
WBC # BLD AUTO: 16.7 K/UL (ref 3.8–10.6)

## 2022-04-16 RX ADMIN — HEPARIN SODIUM SCH UNIT: 5000 INJECTION INTRAVENOUS; SUBCUTANEOUS at 20:04

## 2022-04-16 RX ADMIN — Medication SCH MG: at 08:46

## 2022-04-16 RX ADMIN — RANOLAZINE SCH MG: 500 TABLET, FILM COATED, EXTENDED RELEASE ORAL at 08:47

## 2022-04-16 RX ADMIN — ATORVASTATIN CALCIUM SCH MG: 80 TABLET, FILM COATED ORAL at 08:46

## 2022-04-16 RX ADMIN — CLOPIDOGREL BISULFATE SCH MG: 75 TABLET ORAL at 08:46

## 2022-04-16 RX ADMIN — ISOSORBIDE MONONITRATE SCH MG: 30 TABLET, EXTENDED RELEASE ORAL at 08:46

## 2022-04-16 RX ADMIN — RANOLAZINE SCH MG: 500 TABLET, FILM COATED, EXTENDED RELEASE ORAL at 20:03

## 2022-04-16 RX ADMIN — HYDROMORPHONE HYDROCHLORIDE PRN MG: 1 INJECTION, SOLUTION INTRAMUSCULAR; INTRAVENOUS; SUBCUTANEOUS at 12:05

## 2022-04-16 RX ADMIN — HEPARIN SODIUM SCH UNIT: 5000 INJECTION INTRAVENOUS; SUBCUTANEOUS at 08:48

## 2022-04-16 RX ADMIN — DILTIAZEM HYDROCHLORIDE SCH MLS/HR: 5 INJECTION INTRAVENOUS at 15:53

## 2022-04-16 RX ADMIN — HYDROMORPHONE HYDROCHLORIDE PRN MG: 1 INJECTION, SOLUTION INTRAMUSCULAR; INTRAVENOUS; SUBCUTANEOUS at 03:55

## 2022-04-16 RX ADMIN — METOPROLOL TARTRATE SCH MG: 25 TABLET, FILM COATED ORAL at 08:46

## 2022-04-16 RX ADMIN — NITROGLYCERIN PRN INCH: 20 OINTMENT TOPICAL at 12:05

## 2022-04-16 RX ADMIN — COLCHICINE SCH MG: 0.6 TABLET, FILM COATED ORAL at 20:03

## 2022-04-16 RX ADMIN — FUROSEMIDE SCH MG: 10 INJECTION, SOLUTION INTRAMUSCULAR; INTRAVENOUS at 08:48

## 2022-04-16 RX ADMIN — NITROGLYCERIN PRN INCH: 20 OINTMENT TOPICAL at 20:02

## 2022-04-16 RX ADMIN — SPIRONOLACTONE SCH MG: 25 TABLET, FILM COATED ORAL at 08:46

## 2022-04-16 RX ADMIN — METOPROLOL TARTRATE SCH MG: 25 TABLET, FILM COATED ORAL at 20:03

## 2022-04-16 RX ADMIN — ASPIRIN 81 MG CHEWABLE TABLET SCH MG: 81 TABLET CHEWABLE at 08:46

## 2022-04-16 RX ADMIN — FUROSEMIDE SCH MG: 10 INJECTION, SOLUTION INTRAMUSCULAR; INTRAVENOUS at 20:04

## 2022-04-16 RX ADMIN — DILTIAZEM HYDROCHLORIDE SCH MLS/HR: 5 INJECTION INTRAVENOUS at 00:48

## 2022-04-16 RX ADMIN — COLCHICINE SCH MG: 0.6 TABLET, FILM COATED ORAL at 08:47

## 2022-04-16 RX ADMIN — HYDROMORPHONE HYDROCHLORIDE PRN MG: 1 INJECTION, SOLUTION INTRAMUSCULAR; INTRAVENOUS; SUBCUTANEOUS at 20:34

## 2022-04-16 NOTE — P.PN
Progress Note - Text


Progress Note Date: 04/16/22





Chief Complaint: Chest pain





This is a pleasant 61-year-old patient who follows a Dr. eJri Cespedes.  Chronic 

stable medical conditions include hypertension, hyperlipidemia, anxiety, gout 

for which patient takes allopurinol.  Patient has a known history of coronary 

artery bypass in 2003.  Patient had a cardiac catheterization in 2018.  Was 

found to have chronically occluded vessels.  Medical management was done.  

Patient has cotton used to taking medications when necessary for his angina.


Yesterday patient started having episodes of significant perspiration.  Took his

nitroglycerin with some help.  With the chest pain persisted.  Felt like a 

heaviness all the time.  No radiation.  Tired rundown.  Since patient gets pain 

all the time she was not sure oftentimes what different to do.  This morning 

patient's troponin peaked at 21.  ST elevation in inferior leads.  Patient was 

taken to the cath lab by Dr. MELQUIADES Ott.  Intervention in terms of angioplasty 

stenting was unsuccessful.  More details in his records.  He did speak to the 

patient's wife.


Patient's currently in the ICU.  Still having some pain.  Nitro paste has been 

ordered.


April 15: ICU: Sitting up in bed.  Still having some chest pain.  On Nitropaste.

 Oral intake fair.  Being followed by cardiology.


April 16: ICU: Patient last admin into atrial fibrillation.  Was put on a 

Cardizem drip.  His Zestril dose was cut back.  Still having some chest pain.  

In bed








Active Medications





Allopurinol (Allopurinol 300 Mg Tab)  300 mg PO DAILY [facility]


   Last Admin: 04/16/22 08:46 Dose:  300 mg


   Documented by: 


Aspirin (Aspirin 81 Mg)  81 mg PO DAILY [facility]


   Last Admin: 04/16/22 08:46 Dose:  81 mg


   Documented by: 


Atorvastatin Calcium (Atorvastatin 80 Mg Tab)  80 mg PO DAILY [facility]


   Last Admin: 04/16/22 08:46 Dose:  80 mg


   Documented by: 


Clopidogrel Bisulfate (Clopidogrel 75 Mg Tab)  75 mg PO DAILY [facility]


   Last Admin: 04/16/22 08:46 Dose:  75 mg


   Documented by: 


Colchicine (Colchicine 0.6 Mg Each)  0.6 mg PO BID [facility]


   Last Admin: 04/16/22 08:47 Dose:  0.6 mg


   Documented by: 


Furosemide (Furosemide 10 Mg/Ml 2 Ml Vial)  20 mg IV Q12HR [facility]


   Last Admin: 04/16/22 08:48 Dose:  20 mg


   Documented by: 


Heparin Sodium (Porcine) (Heparin Sodium,Porcine/Pf 5,000 Unit/0.5 Ml Syringe)  

5,000 unit SQ Q12HR [facility]


   Last Admin: 04/16/22 08:48 Dose:  5,000 unit


   Documented by: 


Hydromorphone HCl (Hydromorphone 0.5 Mg/0.5 Ml Syringe)  0.5 mg IVP Q6HR PRN


   PRN Reason: Pain


   Last Admin: 04/16/22 12:05 Dose:  0.5 mg


   Documented by: 


Diltiazem HCl 125 mg/ Sodium (Chloride)  125 mls @ 5 mls/hr IV .Q24H [facility]


   Last Admin: 04/16/22 15:53 Dose:  5 mg/hr, 5 mls/hr


   Documented by: 


Isosorbide Mononitrate (Isosorbide Mononitrate Er 30 Mg Tab.Er.24h)  30 mg PO 

DAILY [facility]


   Last Admin: 04/16/22 08:46 Dose:  30 mg


   Documented by: 


Lisinopril (Lisinopril 5 Mg Tab)  5 mg PO [facility]


Metoprolol Tartrate (Metoprolol Tartrate 25 Mg Tab)  25 mg PO BID [facility]


   Last Admin: 04/16/22 08:46 Dose:  25 mg


   Documented by: 


Naloxone HCl (Naloxone 0.4 Mg/Ml 1 Ml Vial)  0.2 mg IV Q2M PRN


   PRN Reason: Opioid Reversal


Nitroglycerin (Nitroglycerin Oint 1 Inch/Gm Packet)  0.5 inch TOPICAL Q6HR PRN


   PRN Reason: Chest Pain


   Last Admin: 04/16/22 12:05 Dose:  0.5 inch


   Documented by: 


Ranolazine (Ranolazine 500 Mg Tab.Er.12h)  500 mg PO Q12HR [facility]


   Last Admin: 04/16/22 08:47 Dose:  500 mg


   Documented by: 


Spironolactone (Spironolactone 25 Mg Tab)  12.5 mg PO DAILY [facility]


   Last Admin: 04/16/22 08:46 Dose:  12.5 mg


   Documented by: 


Thiamine HCl (Thiamine 100 Mg Tab)  100 mg PO DAILY [facility]


   Last Admin: 04/16/22 08:46 Dose:  100 mg


   Documented by: 














Past medical history to include:


CAD with stent/bypass, hypertension, hyperlipidemia, gout





Social history:


No smoking.  Drinks about 6-8 beers twice a week.  Does one or 2 joints of 

marijuana every day.  .  .





Family history:


Reviewed, noncontributory to presentation





Physical examination:


VITAL SIGNS: 99.6, 84, 16, 10 6 x 66, 95% on 2 L


GENERAL: reclining in bed, .


EYES: Pupils equal.  Conjunctiva normal.


HEENT: External appearance of nose and ears normal, oral cavity grossly normal.


NECK: JVD not raised; masses not palpable.


HEART: First and second heart sounds are normal;  no edema.  


LUNGS: Respiratory rate normal; clear to auscultation.  


ABDOMEN: Soft,  nontender, liver spleen not palpable, no masses palpable.  


PSYCH: Alert and oriented x3;  mood  and affect normal.  


MUSCULOSKELETAL:No Clubbing/cyanosis;muscles-grossly intact.








INVESTIGATIONS, reviewed in the clinical context:


April 16: White count 16.7 hemoglobin 14.4 potassium 3.9 creatinine 0.97


April 15: White count 7.7 hemoglobin 14.8 sodium 132 potassium 4.1 creatinine 

0.7  ALT 69


2-D echocardiogram: Moderate concentric LVH.  EF 45-50%.  Multiple wall motion 

abnormality.


White count 15.5 hemoglobin 15.6 platelets 295 sodium 135 potassium 3.9 

creatinine 0.83


Total bilirubin 1.4  ALT 51


 troponin I less than 0.012, 21.3


LDL 54


EKG tracing personally reviewed by me-ST elevation inferiorly sinus rhythm


Chest x-ray film personally reviewed by me-post bypass changes.  No obvious 

infiltrate





Assessment and plan:





-Acute ST elevation myocardial infarction of the inferior wall


Unsuccessful coronary intervention.  For supportive care.  Follow with 

cardiology





-Paroxysmal atrial fibrillation with rapid ventricular rate


IV Cardizem drip.  Lopressor





-Post infarct angina: Slow to respond


Nitropaste.  Ranexa 500 mg every 12.  Lopressor





-CAD with a prior history of bypass in 2003.  Patient has chronically occluded 

vessels.


Aspirin 81 mg daily, Lipitor 80 mg daily, Plavix) 5 mg daily.  Imdur 30 mg 

daily.  Zestril 5 mg day.  Lopressor 25 mg twice a day





-Recreational marijuana use





-Obesity BMI 31.7


Weight loss measures





-Essential hypertension


Lopressor 25 mg twice a day





-Hyperlipidemia


Lipitor 80 mg daily





-Chronic gout


Allopurinol 300 mg daily











Patient started IV Cardizem drip.  Antianginal medications to continue.  

Discussed with patient.

## 2022-04-16 NOTE — PN
PROGRESS NOTE



This gentleman suffered from an inferior MI with closure of vein graft to the PDA

branch of RCA.  Now he has developed atrial fibrillation, paroxysmal in nature.  I

started him on Cardizem last night.  This morning he is in sinus rhythm, feels more

comfortable.  No evidence of heart failure.  No new murmurs.  I will leave him on 5 mg

of Cardizem, decrease the lisinopril to 5 mg at bedtime, continue other medications.

Blood pressure is 108/70, pulse rate is 84 sinus. JVD 1 cm. No carotid bruit. S1-S2

heard normally. Short systolic murmur.  No change in the murmur pattern.  Lungs reveal

decent air entry. Abdomen is soft.  Lower extremities reveal diminished pulses.

Central nervous system is normal.



Plan is to decrease lisinopril to 5 mg at bedtime, continue Cardizem 5 mg daily,

gradually increase activity and move him to telemetry today.





MMODL / IJN: [id number] / Job#: [id number]

## 2022-04-16 NOTE — ECHOF
Referral Reason:repeat study



MEASUREMENTS

--------

HEIGHT: 175.3 cm

WEIGHT: 99.3 kg

BP: 

RVIDd:   3.5 cm     (< 3.3)

IVSd:   1.2 cm     (0.6 - 1.1)

LVIDd:   5.7 cm     (3.9 - 5.3)

LVPWd:   1.1 cm     (0.6 - 1.1)

IVSs:   1.4 cm

LVIDs:   4.7 cm

LVPWs:   1.4 cm

LA Diam:   4.6 cm     (2.7 - 3.8)

Ao Diam:   3.2 cm     (2.0 - 3.7)

AV Cusp:   2.0 cm     (1.5 - 2.6)

LA Diam:   3.8 cm     (2.7 - 3.8)

RAP:   5.00 mmHg

RVSP:   28.65 mmHg







FINDINGS

--------

Undetermined rhythm.

This was a technically adequate study.

The left ventricular size is normal.   There is mild concentric left ventricular hypertrophy.   Overa
ll left ventricular systolic function is mild-moderately impaired with, an EF between 40 - 45 %.   Ba
sal inferior LV wall motion is hypokinetic.    Mid inferior LV wall motion is hypokinetic.    Basal s
eptal hypokinesis

The right ventricle is normal in size.   Paradoxical motion of the right ventricular septum is consis
tent with post operative status.

The left atrium is mildly dilated.

The right atrial size is normal.

The aortic valve is trileaflet, and appears structurally normal. No aortic stenosis or regurgitation.


Mild mitral regurgitation is present.

Mild tricuspid regurgitation present.   Right ventricular systolic pressure is normal at < 35 mmHg.

There is no pulmonic regurgitation present.

There is a small, generalized pericardial effusion present.



CONCLUSIONS

--------

1. The left ventricular size is normal.

2. There is mild concentric left ventricular hypertrophy.

3. Overall left ventricular systolic function is mild-moderately impaired with, an EF between 40 - 45
 %.

4. Basal inferior LV wall motion is hypokinetic.

5. Mid inferior LV wall motion is hypokinetic.

6. Basal septal hypokinesis

7. The right ventricle is normal in size.

8. Paradoxical motion of the right ventricular septum is consistent with post operative status.

9. The left atrium is mildly dilated.

10. The right atrial size is normal.

11. The aortic valve is trileaflet, and appears structurally normal. No aortic stenosis or regurgitat
ion.

12. Mild mitral regurgitation is present.

13. Mild tricuspid regurgitation present.

14. There is a small, generalized pericardial effusion present.





SONOGRAPHER: Sophie Neely RDCS

## 2022-04-17 LAB
ANION GAP SERPL CALC-SCNC: 9 MMOL/L
BASOPHILS # BLD AUTO: 0 K/UL (ref 0–0.2)
BASOPHILS NFR BLD AUTO: 0 %
BUN SERPL-SCNC: 21 MG/DL (ref 9–20)
CALCIUM SPEC-MCNC: 8.7 MG/DL (ref 8.4–10.2)
CHLORIDE SERPL-SCNC: 94 MMOL/L (ref 98–107)
CO2 SERPL-SCNC: 30 MMOL/L (ref 22–30)
EOSINOPHIL # BLD AUTO: 0 K/UL (ref 0–0.7)
EOSINOPHIL NFR BLD AUTO: 0 %
ERYTHROCYTE [DISTWIDTH] IN BLOOD BY AUTOMATED COUNT: 4.36 M/UL (ref 4.3–5.9)
ERYTHROCYTE [DISTWIDTH] IN BLOOD: 13 % (ref 11.5–15.5)
GLUCOSE SERPL-MCNC: 115 MG/DL (ref 74–99)
HCT VFR BLD AUTO: 41 % (ref 39–53)
HGB BLD-MCNC: 13.6 GM/DL (ref 13–17.5)
LYMPHOCYTES # SPEC AUTO: 1.1 K/UL (ref 1–4.8)
LYMPHOCYTES NFR SPEC AUTO: 9 %
MAGNESIUM SPEC-SCNC: 2 MG/DL (ref 1.6–2.3)
MCH RBC QN AUTO: 31.2 PG (ref 25–35)
MCHC RBC AUTO-ENTMCNC: 33.2 G/DL (ref 31–37)
MCV RBC AUTO: 94 FL (ref 80–100)
MONOCYTES # BLD AUTO: 0.8 K/UL (ref 0–1)
MONOCYTES NFR BLD AUTO: 6 %
NEUTROPHILS # BLD AUTO: 10.5 K/UL (ref 1.3–7.7)
NEUTROPHILS NFR BLD AUTO: 82 %
PLATELET # BLD AUTO: 267 K/UL (ref 150–450)
POTASSIUM SERPL-SCNC: 3.8 MMOL/L (ref 3.5–5.1)
SODIUM SERPL-SCNC: 133 MMOL/L (ref 137–145)
WBC # BLD AUTO: 12.7 K/UL (ref 3.8–10.6)

## 2022-04-17 RX ADMIN — RANOLAZINE SCH MG: 500 TABLET, FILM COATED, EXTENDED RELEASE ORAL at 09:19

## 2022-04-17 RX ADMIN — APIXABAN SCH MG: 5 TABLET, FILM COATED ORAL at 09:19

## 2022-04-17 RX ADMIN — ISOSORBIDE MONONITRATE SCH MG: 30 TABLET, EXTENDED RELEASE ORAL at 09:18

## 2022-04-17 RX ADMIN — FUROSEMIDE SCH MG: 10 INJECTION, SOLUTION INTRAMUSCULAR; INTRAVENOUS at 09:20

## 2022-04-17 RX ADMIN — CLOPIDOGREL BISULFATE SCH MG: 75 TABLET ORAL at 09:19

## 2022-04-17 RX ADMIN — METOPROLOL TARTRATE SCH MG: 50 TABLET, FILM COATED ORAL at 09:19

## 2022-04-17 RX ADMIN — APIXABAN SCH MG: 5 TABLET, FILM COATED ORAL at 21:01

## 2022-04-17 RX ADMIN — Medication SCH MG: at 09:19

## 2022-04-17 RX ADMIN — SPIRONOLACTONE SCH MG: 25 TABLET, FILM COATED ORAL at 09:19

## 2022-04-17 RX ADMIN — METOPROLOL TARTRATE SCH MG: 50 TABLET, FILM COATED ORAL at 21:01

## 2022-04-17 RX ADMIN — HYDROMORPHONE HYDROCHLORIDE PRN MG: 1 INJECTION, SOLUTION INTRAMUSCULAR; INTRAVENOUS; SUBCUTANEOUS at 13:21

## 2022-04-17 RX ADMIN — RANOLAZINE SCH MG: 500 TABLET, FILM COATED, EXTENDED RELEASE ORAL at 21:01

## 2022-04-17 RX ADMIN — COLCHICINE SCH: 0.6 TABLET, FILM COATED ORAL at 21:01

## 2022-04-17 RX ADMIN — HYDROMORPHONE HYDROCHLORIDE PRN MG: 1 INJECTION, SOLUTION INTRAMUSCULAR; INTRAVENOUS; SUBCUTANEOUS at 21:42

## 2022-04-17 RX ADMIN — DILTIAZEM HYDROCHLORIDE SCH MLS/HR: 5 INJECTION INTRAVENOUS at 18:14

## 2022-04-17 RX ADMIN — COLCHICINE SCH MG: 0.6 TABLET, FILM COATED ORAL at 09:19

## 2022-04-17 RX ADMIN — AMIODARONE HYDROCHLORIDE SCH MG: 200 TABLET ORAL at 09:19

## 2022-04-17 RX ADMIN — ATORVASTATIN CALCIUM SCH MG: 80 TABLET, FILM COATED ORAL at 09:19

## 2022-04-17 RX ADMIN — AMIODARONE HYDROCHLORIDE SCH MG: 200 TABLET ORAL at 21:01

## 2022-04-17 RX ADMIN — HYDROMORPHONE HYDROCHLORIDE PRN MG: 1 INJECTION, SOLUTION INTRAMUSCULAR; INTRAVENOUS; SUBCUTANEOUS at 04:35

## 2022-04-17 RX ADMIN — FUROSEMIDE SCH MG: 10 INJECTION, SOLUTION INTRAMUSCULAR; INTRAVENOUS at 21:00

## 2022-04-17 NOTE — P.PN
Progress Note - Text


Progress Note Date: 04/17/22





Chief Complaint: Chest pain





This is a pleasant 61-year-old patient who follows a Dr. Jeri Cespedes.  Chronic 

stable medical conditions include hypertension, hyperlipidemia, anxiety, gout 

for which patient takes allopurinol.  Patient has a known history of coronary 

artery bypass in 2003.  Patient had a cardiac catheterization in 2018.  Was 

found to have chronically occluded vessels.  Medical management was done.  

Patient has cotton used to taking medications when necessary for his angina.


Yesterday patient started having episodes of significant perspiration.  Took his

nitroglycerin with some help.  With the chest pain persisted.  Felt like a 

heaviness all the time.  No radiation.  Tired rundown.  Since patient gets pain 

all the time she was not sure oftentimes what different to do.  This morning 

patient's troponin peaked at 21.  ST elevation in inferior leads.  Patient was 

taken to the cath lab by Dr. MELQUIADES Ott.  Intervention in terms of angioplasty 

stenting was unsuccessful.  More details in his records.  He did speak to the 

patient's wife.


Patient's currently in the ICU.  Still having some pain.  Nitro paste has been 

ordered.


April 15: ICU: Sitting up in bed.  Still having some chest pain.  On Nitropaste.

 Oral intake fair.  Being followed by cardiology.


April 16: ICU: Patient last admin into atrial fibrillation.  Was put on a 

Cardizem drip.  His Zestril dose was cut back.  Still having some chest pain.  

In bed


April 17: ICU: Sinus rhythm.  Some stress pressure.  Slight perspiration.  On 

Cardizem drip.  Started on amiodarone per cardiology for aberrancy.  Lopressor 

increased.








Active Medications





Allopurinol (Allopurinol 300 Mg Tab)  300 mg PO DAILY [facility]


   Last Admin: 04/17/22 09:18 Dose:  300 mg


   Documented by: 


Amiodarone HCl (Amiodarone 200 Mg Tab)  200 mg PO BID [facility]


   Last Admin: 04/17/22 09:19 Dose:  200 mg


   Documented by: 


Apixaban (Apixaban 5 Mg Tab)  5 mg PO BID [facility]; Protocol


   Last Admin: 04/17/22 09:19 Dose:  5 mg


   Documented by: 


Atorvastatin Calcium (Atorvastatin 80 Mg Tab)  80 mg PO DAILY [facility]


   Last Admin: 04/17/22 09:19 Dose:  80 mg


   Documented by: 


Clopidogrel Bisulfate (Clopidogrel 75 Mg Tab)  75 mg PO DAILY [facility]


   Last Admin: 04/17/22 09:19 Dose:  75 mg


   Documented by: 


Colchicine (Colchicine 0.6 Mg Each)  0.6 mg PO BID [facility]


   Last Admin: 04/17/22 09:19 Dose:  0.6 mg


   Documented by: 


Furosemide (Furosemide 10 Mg/Ml 2 Ml Vial)  20 mg IV Q12HR [facility]


   Last Admin: 04/17/22 09:20 Dose:  20 mg


   Documented by: 


Hydromorphone HCl (Hydromorphone 0.5 Mg/0.5 Ml Syringe)  0.5 mg IVP Q6HR PRN


   PRN Reason: Pain


   Last Admin: 04/17/22 04:35 Dose:  0.5 mg


   Documented by: 


Diltiazem HCl 125 mg/ Sodium (Chloride)  125 mls @ 5 mls/hr IV .Q24H [facility]


   Last Admin: 04/16/22 15:53 Dose:  5 mg/hr, 5 mls/hr


   Documented by: 


Isosorbide Mononitrate (Isosorbide Mononitrate Er 30 Mg Tab.Er.24h)  30 mg PO 

DAILY [facility]


   Last Admin: 04/17/22 09:18 Dose:  30 mg


   Documented by: 


Lisinopril (Lisinopril 5 Mg Tab)  5 mg PO HS [facility]


   Last Admin: 04/16/22 20:03 Dose:  5 mg


   Documented by: 


Metoprolol Tartrate (Metoprolol Tartrate 50 Mg Tab)  50 mg PO BID [facility]


   Last Admin: 04/17/22 09:19 Dose:  50 mg


   Documented by: 


Naloxone HCl (Naloxone 0.4 Mg/Ml 1 Ml Vial)  0.2 mg IV Q2M PRN


   PRN Reason: Opioid Reversal


Nitroglycerin (Nitroglycerin Oint 1 Inch/Gm Packet)  0.5 inch TOPICAL Q6HR PRN


   PRN Reason: Chest Pain


   Last Admin: 04/16/22 20:02 Dose:  0.5 inch


   Documented by: 


Ranolazine (Ranolazine 500 Mg Tab.Er.12h)  500 mg PO Q12HR [facility]


   Last Admin: 04/17/22 09:19 Dose:  500 mg


   Documented by: 


Spironolactone (Spironolactone 25 Mg Tab)  12.5 mg PO DAILY [facility]


   Last Admin: 04/17/22 09:19 Dose:  12.5 mg


   Documented by: 


Thiamine HCl (Thiamine 100 Mg Tab)  100 mg PO DAILY [facility]


   Last Admin: 04/17/22 09:19 Dose:  100 mg


   Documented by: 




















Past medical history to include:


CAD with stent/bypass, hypertension, hyperlipidemia, gout





Social history:


No smoking.  Drinks about 6-8 beers twice a week.  Does one or 2 joints of 

marijuana every day.  .  .





Family history:


Reviewed, noncontributory to presentation





Physical examination:


VITAL SIGNS: 113, 18, 1 10 x 88, 94% on 2 L


GENERAL: reclining in chair,.


EYES: Pupils equal.  Conjunctiva normal.


HEENT: External appearance of nose and ears normal, oral cavity grossly normal.


NECK: JVD not raised; masses not palpable.


HEART: First and second heart sounds are normal;  no edema.  


LUNGS: Respiratory rate normal; clear to auscultation.  


ABDOMEN: Soft,  nontender, liver spleen not palpable, no masses palpable.  


PSYCH: Alert and oriented x3;  mood  and affect normal.  


MUSCULOSKELETAL:No Clubbing/cyanosis;muscles-grossly intact.








INVESTIGATIONS, reviewed in the clinical context:


April 17: White count 12.7 hemoglobin 13.6and 3.8 creatinine 1.01


April 16: White count 16.7 hemoglobin 14.4 potassium 3.9 creatinine 0.97


April 15: White count 7.7 hemoglobin 14.8 sodium 132 potassium 4.1 creatinine 

0.7  ALT 69


2-D echocardiogram: Moderate concentric LVH.  EF 45-50%.  Multiple wall motion 

abnormality.


White count 15.5 hemoglobin 15.6 platelets 295 sodium 135 potassium 3.9 

creatinine 0.83


Total bilirubin 1.4  ALT 51


 troponin I less than 0.012, 21.3


LDL 54


EKG tracing personally reviewed by me-ST elevation inferiorly sinus rhythm


Chest x-ray film personally reviewed by me-post bypass changes.  No obvious 

infiltrate





Assessment and plan:





-Acute ST elevation myocardial infarction of the inferior wall


Unsuccessful coronary intervention.  For supportive care.  Follow with 

cardiology





-Paroxysmal atrial fibrillation with rapid ventricular rate


IV Cardizem drip.  Lopressor





-Aberrancy on telemetry


Started on amiodarone by cardiology





-Post infarct angina: Slow to respond


Nitropaste.  Ranexa 500 mg every 12.  Lopressor increased to 50 mg twice a day.





-CAD with a prior history of bypass in 2003.  Patient has chronically occluded 

vessels.


Aspirin 81 mg daily, Lipitor 80 mg daily, Plavix) 5 mg daily.  Imdur 30 mg 

daily.  Zestril 5 mg day.  Lopressor 50 mg twice a day





-Recreational marijuana use





-Obesity BMI 31.7


Weight loss measures





-Essential hypertension


Lopressor 50 mg twice a day





-Hyperlipidemia


Lipitor 80 mg daily





-Chronic gout


Allopurinol 300 mg daily











Patient started on by mouth amiodarone.  Lopressor increased.  Other medications

to continue.  Follow with cardiology.

## 2022-04-17 NOTE — PN
PROGRESS NOTE



Mr. Hoang is status post acute inferior MI with unsuccessful PCI to open a very

large vein graft to the RCA.  His echo yesterday revealed ejection fraction of nearly

45% with inferior wall hypokinesia.  He had short runs of wide QRS tachycardia, but on

looking at the rhythm strips, it appears to be an aberrancy rather than ventricular

tachycardia.  However, amiodarone will be initiated, and I am also going to start him

on Eliquis in view of his atrial fibrillation.  We will discontinue aspirin, start

Eliquis 5 mg b.i.d., amiodarone 200 mg b.i.d., and increase the metoprolol tartrate to

50 mg b.i.d.  I discussed my thoughts in detail with the patient.



Blood pressure is 120/80, pulse rate is about 80 to 90, irregular. JVD 1 cm.  No

carotid bruit. S1-S2 heard normally. Short systolic murmur at the base.  Lungs reveal

decent air entry. Abdomen is soft, nontender.  Lower extremities reveal normal pulses,

no edema.  Central system is normal.



IMPRESSION:

1. Acute inferior myocardial infarction with unsuccessful PCI.

2. Paroxysmal atrial fibrillation.

3. Wide QRS tachycardia aberrancy.

4. History of hypertension, hyperlipidemia and previous bypass surgery.



RECOMMENDATIONS:

I will increase the Lopressor to 50 mg b.i.d., add amiodarone 200 mg b.i.d.,

discontinue aspirin, add Eliquis 5 mg b.i.d.  Discussed my thoughts in detail with the

patient.





MMODL / IJN: [id number] / Job#: [id number]

## 2022-04-18 LAB
ALBUMIN SERPL-MCNC: 3.5 G/DL (ref 3.5–5)
ALP SERPL-CCNC: 98 U/L (ref 38–126)
ALT SERPL-CCNC: 99 U/L (ref 4–49)
ANION GAP SERPL CALC-SCNC: 10 MMOL/L
AST SERPL-CCNC: 104 U/L (ref 17–59)
BASOPHILS # BLD AUTO: 0.1 K/UL (ref 0–0.2)
BASOPHILS NFR BLD AUTO: 0 %
BUN SERPL-SCNC: 19 MG/DL (ref 9–20)
CALCIUM SPEC-MCNC: 8.7 MG/DL (ref 8.4–10.2)
CHLORIDE SERPL-SCNC: 98 MMOL/L (ref 98–107)
CO2 SERPL-SCNC: 26 MMOL/L (ref 22–30)
EOSINOPHIL # BLD AUTO: 0.1 K/UL (ref 0–0.7)
EOSINOPHIL NFR BLD AUTO: 1 %
ERYTHROCYTE [DISTWIDTH] IN BLOOD BY AUTOMATED COUNT: 4.57 M/UL (ref 4.3–5.9)
ERYTHROCYTE [DISTWIDTH] IN BLOOD: 12.4 % (ref 11.5–15.5)
GLUCOSE BLD-MCNC: 156 MG/DL (ref 75–99)
GLUCOSE BLD-MCNC: 198 MG/DL (ref 75–99)
GLUCOSE SERPL-MCNC: 173 MG/DL (ref 74–99)
HCT VFR BLD AUTO: 43.9 % (ref 39–53)
HGB BLD-MCNC: 14 GM/DL (ref 13–17.5)
LYMPHOCYTES # SPEC AUTO: 1.4 K/UL (ref 1–4.8)
LYMPHOCYTES NFR SPEC AUTO: 13 %
MAGNESIUM SPEC-SCNC: 2 MG/DL (ref 1.6–2.3)
MCH RBC QN AUTO: 30.7 PG (ref 25–35)
MCHC RBC AUTO-ENTMCNC: 32 G/DL (ref 31–37)
MCV RBC AUTO: 95.9 FL (ref 80–100)
MONOCYTES # BLD AUTO: 0.6 K/UL (ref 0–1)
MONOCYTES NFR BLD AUTO: 6 %
NEUTROPHILS # BLD AUTO: 8.4 K/UL (ref 1.3–7.7)
NEUTROPHILS NFR BLD AUTO: 78 %
PLATELET # BLD AUTO: 293 K/UL (ref 150–450)
POTASSIUM SERPL-SCNC: 3.3 MMOL/L (ref 3.5–5.1)
PROT SERPL-MCNC: 6.7 G/DL (ref 6.3–8.2)
SODIUM SERPL-SCNC: 134 MMOL/L (ref 137–145)
WBC # BLD AUTO: 10.8 K/UL (ref 3.8–10.6)

## 2022-04-18 RX ADMIN — FUROSEMIDE SCH: 10 INJECTION, SOLUTION INTRAMUSCULAR; INTRAVENOUS at 09:13

## 2022-04-18 RX ADMIN — HYDROMORPHONE HYDROCHLORIDE PRN MG: 1 INJECTION, SOLUTION INTRAMUSCULAR; INTRAVENOUS; SUBCUTANEOUS at 17:00

## 2022-04-18 RX ADMIN — MAGNESIUM SULFATE IN DEXTROSE SCH MLS/HR: 10 INJECTION, SOLUTION INTRAVENOUS at 09:24

## 2022-04-18 RX ADMIN — Medication SCH MG: at 09:30

## 2022-04-18 RX ADMIN — LIDOCAINE HYDROCHLORIDE ANHYDROUS AND DEXTROSE MONOHYDRATE SCH MLS/HR: .8; 5 INJECTION, SOLUTION INTRAVENOUS at 23:44

## 2022-04-18 RX ADMIN — HYDROMORPHONE HYDROCHLORIDE PRN MG: 1 INJECTION, SOLUTION INTRAMUSCULAR; INTRAVENOUS; SUBCUTANEOUS at 05:09

## 2022-04-18 RX ADMIN — METOPROLOL TARTRATE STA MG: 1 INJECTION, SOLUTION INTRAVENOUS at 08:37

## 2022-04-18 RX ADMIN — MAGNESIUM SULFATE IN DEXTROSE SCH MLS/HR: 10 INJECTION, SOLUTION INTRAVENOUS at 08:25

## 2022-04-18 RX ADMIN — METOPROLOL TARTRATE STA MG: 1 INJECTION, SOLUTION INTRAVENOUS at 08:23

## 2022-04-18 RX ADMIN — AMIODARONE HYDROCHLORIDE SCH MG: 200 TABLET ORAL at 09:11

## 2022-04-18 RX ADMIN — COLCHICINE SCH MG: 0.6 TABLET, FILM COATED ORAL at 11:25

## 2022-04-18 RX ADMIN — CLOPIDOGREL BISULFATE SCH MG: 75 TABLET ORAL at 09:23

## 2022-04-18 RX ADMIN — COLCHICINE SCH MG: 0.6 TABLET, FILM COATED ORAL at 20:19

## 2022-04-18 RX ADMIN — APIXABAN SCH MG: 5 TABLET, FILM COATED ORAL at 20:19

## 2022-04-18 RX ADMIN — SPIRONOLACTONE SCH MG: 25 TABLET, FILM COATED ORAL at 09:23

## 2022-04-18 RX ADMIN — FUROSEMIDE SCH MG: 10 INJECTION, SOLUTION INTRAMUSCULAR; INTRAVENOUS at 20:19

## 2022-04-18 RX ADMIN — APIXABAN SCH MG: 5 TABLET, FILM COATED ORAL at 09:12

## 2022-04-18 RX ADMIN — LIDOCAINE HYDROCHLORIDE ANHYDROUS AND DEXTROSE MONOHYDRATE SCH MLS/HR: .8; 5 INJECTION, SOLUTION INTRAVENOUS at 08:39

## 2022-04-18 RX ADMIN — RANOLAZINE SCH: 500 TABLET, FILM COATED, EXTENDED RELEASE ORAL at 09:31

## 2022-04-18 RX ADMIN — ATORVASTATIN CALCIUM SCH MG: 80 TABLET, FILM COATED ORAL at 09:12

## 2022-04-18 RX ADMIN — AMIODARONE HYDROCHLORIDE SCH MG: 200 TABLET ORAL at 20:19

## 2022-04-18 RX ADMIN — RANOLAZINE SCH MG: 500 TABLET, FILM COATED, EXTENDED RELEASE ORAL at 21:06

## 2022-04-18 RX ADMIN — METOPROLOL TARTRATE SCH MG: 25 TABLET, FILM COATED ORAL at 18:40

## 2022-04-18 NOTE — P.PN
Subjective


Progress Note Date: 04/18/22


Principal diagnosis: 





Ischemic cardio myopathy


Recent inferior wall MI


Status post stenting


This morning he started experiencing episodes of PAF that would induce fast 

monomorphic VT


Subsequently he started experiencing PMVT organized into monomorphic ventricular

tachycardia


He was treated with IV metoprolol pushes, a total of 30 mg over 15-20 minutes


IV lidocaine bolus and drip


IV amiodarone bolus followed by by mouth


IV magnesium


Subsequently with by mouth Lopressor at a higher dose





Lisinopril is on hold


Imdur discontinued


I will personally present for the above management


The patient is a 61-year-old male with past medical history of coronary artery 

disease status post bypass, who is currently admitted to the hospital with an 

inferior wall myocardial function.  The patient underwent coronary angiogram on 

04/14/2022 with Dr. MELQUIADES Ott.  He was found to have severe triple-vessel 

disease, with occlusion of SVG to RCA with thrombus.  Attempted mechanical 

thrombectomy was not successful.  The patient had been recovering on the cardiac

floor, however it was noted that the patient started having runs of ventricular 

tachycardia around 4 AM.  A was called around 7 AM and the patient was 

subsequently transferred to the ICU.





On arrival to the ICU the patient was having long runs of wide complex 

ventricular tachycardia, which will abrupt with vagal maneuvers.  The patient is

alert and oriented, as well as hemodynamically stable.  The patient denies any 

chest pain or chest pressure.





Dr. Richmond while at the bedside, advised nursing staff to give a total of 30 mg

of IV push Lopressor as well as a 300 mg amiodarone bolus.  The patient 

continued to have runs of ventricular tachycardia, which was mostly monomorphic,

however occasionally short runs would have a torsades-like appearance.  The 

patient was then given 2 g of IV magnesium.  The patient will ultimately was 

given a 50 mg lidocaine bolus and started on a continuous drip.





GENERAL: Well-appearing, well-nourished and in no acute distress.


NECK: Supple without JVD or thyromegaly.


LUNGS: Breath sounds clear to auscultation bilaterally. Respiration equal and 

unlabored.  No wheezes, rales or rhonchi.


HEART: Regular rate and rhythm.  Soft systolic murmur.  No rubs or gallops. S1 

and S2 heard.


EXTREMITIES: Normal range of motion, no edema.  No clubbing or cyanosis. 

Peripheral pulses intact and strong.





VITALS:


Blood pressure 103/70, pulse 77, temp 98.3F, respiratory rate 12





TELEMETRY:


Sinus rhythm with occasional PACs with brief runs of atrial fibrillation.


Monomorphic ventricular tachycardia.  Short brief runs of polymorphic VT, which 

appeared to be torsades.





LABS:


WBC 10.8, hemoglobin 14.0, hematocrit 43.9, platelet 293, sodium 134, potassium 

3.3, chloride 98, BUN 19, creatinine 1.01, magnesium 2.0


, , ALT 99, troponin 15





IMPRESSION:


Acute inferior wall MI, secondary to occluded SVG to RCA, unsuccessful 

thrombectomy


Monomorphic ventricular tachycardia, secondary to ischemia


Ischemic cardiomyopathy, EF 45% with inferior hypokinesis


Paroxysmal atrial fibrillation, currently on Eliquis


History of CAD, s/p prior CABG


Hypertension


Dyslipidemia


Hypokalemia, supplementation per protocol





PLAN:


Continue oral amiodarone 400 mg twice a day


Continue lidocaine drip


Increase by mouth metoprolol to 75 mg twice a day


Continue to monitor electrolytes


Prognosis guarded.  Further recommendations will be based on clinical course


 





I am dictating on behalf of Dr Tavon Richmond's history/physical and 

assessment/plan.








Objective





- Vital Signs


Vital signs: 


                                   Vital Signs











Temp  98.3 F   04/18/22 09:05


 


Pulse  77   04/18/22 09:05


 


Resp  12   04/18/22 09:05


 


BP  103/70   04/18/22 09:05


 


Pulse Ox  93 L  04/18/22 09:05








                                 Intake & Output











 04/17/22 04/18/22 04/18/22





 18:59 06:59 18:59


 


Intake Total 495 120 250


 


Output Total 775  


 


Balance -280 120 250


 


Weight  98 kg 


 


Intake:   


 


  Intake, IV Titration 245  250





  Amount   


 


    Diltiazem 125 mg In 125  





    Sodium Chloride 0.9% 100   





    ml @ 5 MG/HR 5 mls/hr IV   





    .[facility]H [facility][id number]   


 


    Sodium Chloride 0.9% 1, 120  250





    000 ml @ 0 mls/hr IV .STK   





    -[facility] Rx#:[id number]   


 


  Oral 250 120 


 


Output:   


 


  Urine 775  


 


Other:   


 


  Voiding Method  Urinal 


 


  # Voids 2 1 


 


  # Bowel Movements 2  














- Labs


CBC & Chem 7: 


                                 04/18/22 07:52





                                 04/18/22 07:52


Labs: 


                  Abnormal Lab Results - Last 24 Hours (Table)











  04/18/22 04/18/22 04/18/22 Range/Units





  07:49 07:52 07:52 


 


WBC   10.8 H   (3.8-10.6)  k/uL


 


Neutrophils #   8.4 H   (1.3-7.7)  k/uL


 


Sodium    134 L  (137-145)  mmol/L


 


Potassium    3.3 L  (3.5-5.1)  mmol/L


 


Glucose    173 H  (74-99)  mg/dL


 


POC Glucose (mg/dL)  156 H    (75-99)  mg/dL


 


AST    104 H  (17-59)  U/L


 


ALT    99 H  (4-49)  U/L


 


Troponin I     (0.000-0.034)  ng/mL














  04/18/22 04/18/22 Range/Units





  07:54 08:12 


 


WBC    (3.8-10.6)  k/uL


 


Neutrophils #    (1.3-7.7)  k/uL


 


Sodium    (137-145)  mmol/L


 


Potassium    (3.5-5.1)  mmol/L


 


Glucose    (74-99)  mg/dL


 


POC Glucose (mg/dL)   198 H  (75-99)  mg/dL


 


AST    (17-59)  U/L


 


ALT    (4-49)  U/L


 


Troponin I  15.000 H*   (0.000-0.034)  ng/mL

## 2022-04-18 NOTE — P.EN
A- team:





Indication: V tach 





Arrived on Scene to find: Nursing i at bed side attaching pads 





Patient seen and examined at bedside.  He complains of intermittent dizziness 

and diaphoresis. He has persistent chest heaviness since arrival no shortness of

breath








Vital signs reviewed


General: ill appearing, moderated distress, +diaphoretic, appears at stated age


Derm: warm, dry


Head: atraumatic, normocephalic, symmetric


Eyes: EOMI, no lid lag, anicteric sclera


Mouth: no lip lesion, mucus membranes moist


Cardiovascular: S1S2 irreg, no murmur, positive posterior tibial pulse 

bilateral, 


Lungs: Decreased bs bilateral, no rhonchi, no rales , no accessory muscle use


Abdominal: soft,  nontender to palpation, no guarding, no appreciable organomega

ly


Ext: no gross muscle atrophy, no edema, no contractures


Neuro:  CN II-XI grossly intact, no focal neuro deficits


Psych: Alert, oriented, appropriate affect 





Assessment: 


Intermittent V-tach runs


A fib/flutter


NSTEMI 


Ischemic cardiomyopathy with EF 40-45%





Plan:


Amio 150 cc bolus and then  drip, additional 150 bolus as directed by Dr. Richmond 


Called cardio





Disposition: 


Transferred to ICU





After arrival to the ICU Dr. Richmond took over management and patient received 2

grams of mag and additional metoprolol and lidocaine 





Notified: 


Dr. Stephen Richmond presented to bedside. 





A Total of 35 minutes of critical care time was spent on the complex care of 

this patient.

## 2022-04-18 NOTE — P.PN
Progress Note - Text


Progress Note Date: 04/18/22





Chief Complaint: Chest pain





This is a pleasant 61-year-old patient who follows a Dr. Jeri Cespedes.  Chronic 

stable medical conditions include hypertension, hyperlipidemia, anxiety, gout 

for which patient takes allopurinol.  Patient has a known history of coronary 

artery bypass in 2003.  Patient had a cardiac catheterization in 2018.  Was 

found to have chronically occluded vessels.  Medical management was done.  

Patient has cotton used to taking medications when necessary for his angina.


Yesterday patient started having episodes of significant perspiration.  Took his

nitroglycerin with some help.  With the chest pain persisted.  Felt like a 

heaviness all the time.  No radiation.  Tired rundown.  Since patient gets pain 

all the time she was not sure oftentimes what different to do.  This morning 

patient's troponin peaked at 21.  ST elevation in inferior leads.  Patient was 

taken to the cath lab by Dr. MELQUIADES Ott.  Intervention in terms of angioplasty 

stenting was unsuccessful.  More details in his records.  He did speak to the 

patient's wife.


Patient's currently in the ICU.  Still having some pain.  Nitro paste has been 

ordered.


April 15: ICU: Sitting up in bed.  Still having some chest pain.  On Nitropaste.

 Oral intake fair.  Being followed by cardiology.


April 16: ICU: Patient last admin into atrial fibrillation.  Was put on a 

Cardizem drip.  His Zestril dose was cut back.  Still having some chest pain.  

In bed


April 17: ICU: Sinus rhythm.  Some stress pressure.  Slight perspiration.  On 

Cardizem drip.  Started on amiodarone per cardiology for aberrancy.  Lopressor 

increased.


April 18: Patient earlier today went into monomorphic then polymorphic V. tach. 

Was given IV Lopressor bolus, IV amiodarone bolus.  IV lidocaine.  Moved back to

the ICU.  Patient is having some chest discomfort perspiration.  Seen by Dr. Tavon Richmond from cardiology.  Changed to by mouth amiodarone subsequently.








Active Medications





Allopurinol (Allopurinol 300 Mg Tab)  300 mg PO DAILY [facility]


   Last Admin: 04/18/22 11:25 Dose:  300 mg


   Documented by: 


Amiodarone HCl (Amiodarone 200 Mg Tab)  400 mg PO BID [facility]


   Last Admin: 04/18/22 09:11 Dose:  400 mg


   Documented by: 


Apixaban (Apixaban 5 Mg Tab)  5 mg PO BID [facility]; Protocol


   Last Admin: 04/18/22 09:12 Dose:  5 mg


   Documented by: 


Atorvastatin Calcium (Atorvastatin 80 Mg Tab)  80 mg PO DAILY [facility]


   Last Admin: 04/18/22 09:12 Dose:  80 mg


   Documented by: 


Clopidogrel Bisulfate (Clopidogrel 75 Mg Tab)  75 mg PO DAILY [facility]


   Last Admin: 04/18/22 09:23 Dose:  75 mg


   Documented by: 


Colchicine (Colchicine 0.6 Mg Each)  0.6 mg PO BID [facility]


   Last Admin: 04/18/22 11:25 Dose:  0.6 mg


   Documented by: 


Furosemide (Furosemide 10 Mg/Ml 2 Ml Vial)  20 mg IV Q12HR [facility]


   Last Admin: 04/18/22 09:13 Dose:  Not Given


   Documented by: 


Hydromorphone HCl (Hydromorphone 0.5 Mg/0.5 Ml Syringe)  0.5 mg IVP Q6HR PRN


   PRN Reason: Pain


   Last Admin: 04/18/22 17:00 Dose:  0.5 mg


   Documented by: 


Lidocaine HCl/Dextrose 2,000 (mg/ IV Solution)  250 mls @ 15 mls/hr IV .[id number] 

[facility]


   Last Admin: 04/18/22 08:39 Dose:  2 mg/min, 15 mls/hr


   Documented by: 


Metoprolol Tartrate (Metoprolol Tartrate 25 Mg Tab)  75 mg PO TID [facility]


   Last Admin: 04/18/22 18:40 Dose:  75 mg


   Documented by: 


Naloxone HCl (Naloxone 0.4 Mg/Ml 1 Ml Vial)  0.2 mg IV Q2M PRN


   PRN Reason: Opioid Reversal


Nitroglycerin (Nitroglycerin Oint 1 Inch/Gm Packet)  0.5 inch TOPICAL Q6HR PRN


   PRN Reason: Chest Pain


   Last Admin: 04/16/22 20:02 Dose:  0.5 inch


   Documented by: 


Ranolazine (Ranolazine 500 Mg Tab.Er.12h)  500 mg PO Q12HR [facility]


   Last Admin: 04/18/22 09:31 Dose:  Not Given


   Documented by: 


Spironolactone (Spironolactone 25 Mg Tab)  12.5 mg PO DAILY [facility]


   Last Admin: 04/18/22 09:23 Dose:  12.5 mg


   Documented by: 


Thiamine HCl (Thiamine 100 Mg Tab)  100 mg PO DAILY [facility]


   Last Admin: 04/18/22 09:30 Dose:  100 mg


   Documented by: 




















Past medical history to include:


CAD with stent/bypass, hypertension, hyperlipidemia, gout





Social history:


No smoking.  Drinks about 6-8 beers twice a week.  Does one or 2 joints of 

marijuana every day.  .  .





Family history:


Reviewed, noncontributory to presentation





Physical examination:


VITAL SIGNS: 98.3, 79, 18, 111/84, 98% on 2 L


GENERAL: reclining in bed, awake tired


EYES: Pupils equal.  Conjunctiva normal.


HEENT: External appearance of nose and ears normal, oral cavity grossly normal.


NECK: JVD not raised; masses not palpable.


HEART: First and second heart sounds are normal;  no edema.  


LUNGS: Respiratory rate normal; clear to auscultation.  


ABDOMEN: Soft,  nontender, liver spleen not palpable, no masses palpable.  


PSYCH: Alert and oriented x3;  mood  and affect normal.  


MUSCULOSKELETAL:No Clubbing/cyanosis;muscles-grossly intact.








INVESTIGATIONS, reviewed in the clinical context:


April 18: White count 10.8 hemoglobin 14 platelets 293 potassium 3.319 

creatinine 1.01.  Troponin I 15 


April 17: White count 12.7 hemoglobin 13.6and 3.8 creatinine 1.01


April 16: White count 16.7 hemoglobin 14.4 potassium 3.9 creatinine 0.97


April 15: White count 7.7 hemoglobin 14.8 sodium 132 potassium 4.1 creatinine 

0.7  ALT 69


2-D echocardiogram: Moderate concentric LVH.  EF 45-50%.  Multiple wall motion 

abnormality.


White count 15.5 hemoglobin 15.6 platelets 295 sodium 135 potassium 3.9 

creatinine 0.83


Total bilirubin 1.4  ALT 51


 troponin I less than 0.012, 21.3


LDL 54


EKG tracing personally reviewed by me-ST elevation inferiorly sinus rhythm


Chest x-ray film personally reviewed by me-post bypass changes.  No obvious 

infiltrate





Assessment and plan:





-Acute ST elevation myocardial infarction of the inferior wall


Unsuccessful coronary intervention.  For supportive care.  Follow with 

cardiology





-Mono and polymorphic V. tach, nonsustained


IV lidocaine drip.  IV amiodarone bolus given.  By mouth amiodarone started..





-Paroxysmal atrial fibrillation with rapid ventricular rate: No sinus rhythm


IV Cardizem drip discontinued.  Lopressor





-Post infarct angina: Slow to respond


Nitropaste.  Ranexa 500 mg every 12.  Lopressor increased to 35 mg twice a day.





-CAD with a prior history of bypass in 2003.  Patient has chronically occluded 

vessels.


Aspirin 81 mg daily, Lipitor 80 mg daily, Plavix) 5 mg daily.  Imdur 30 mg 

daily.  Zestril 5 mg day.  Lopressor 75 mg twice a day





-Recreational marijuana use





-Obesity BMI 31.7


Weight loss measures





-Essential hypertension


Lopressor 50 mg twice a day





-Hyperlipidemia


Lipitor 80 mg daily





-Chronic gout


Allopurinol 300 mg daily











Episode of polymorphic monomorphic V. tach.  IV lidocaine drip.  Lopressor 

increased to 75 mg twice a day.  Noted IV amiodarone.  In ICU.  Follow with 

cardiology.

## 2022-04-19 LAB
ANION GAP SERPL CALC-SCNC: 7 MMOL/L
BASOPHILS # BLD AUTO: 0 K/UL (ref 0–0.2)
BASOPHILS NFR BLD AUTO: 0 %
BUN SERPL-SCNC: 16 MG/DL (ref 9–20)
CALCIUM SPEC-MCNC: 8.4 MG/DL (ref 8.4–10.2)
CHLORIDE SERPL-SCNC: 100 MMOL/L (ref 98–107)
CO2 SERPL-SCNC: 29 MMOL/L (ref 22–30)
EOSINOPHIL # BLD AUTO: 0.1 K/UL (ref 0–0.7)
EOSINOPHIL NFR BLD AUTO: 1 %
ERYTHROCYTE [DISTWIDTH] IN BLOOD BY AUTOMATED COUNT: 4.34 M/UL (ref 4.3–5.9)
ERYTHROCYTE [DISTWIDTH] IN BLOOD: 12.9 % (ref 11.5–15.5)
GLUCOSE SERPL-MCNC: 106 MG/DL (ref 74–99)
HCT VFR BLD AUTO: 40.7 % (ref 39–53)
HGB BLD-MCNC: 13.8 GM/DL (ref 13–17.5)
LYMPHOCYTES # SPEC AUTO: 1.3 K/UL (ref 1–4.8)
LYMPHOCYTES NFR SPEC AUTO: 15 %
MAGNESIUM SPEC-SCNC: 2.1 MG/DL (ref 1.6–2.3)
MCH RBC QN AUTO: 31.8 PG (ref 25–35)
MCHC RBC AUTO-ENTMCNC: 34 G/DL (ref 31–37)
MCV RBC AUTO: 93.6 FL (ref 80–100)
MONOCYTES # BLD AUTO: 0.8 K/UL (ref 0–1)
MONOCYTES NFR BLD AUTO: 9 %
NEUTROPHILS # BLD AUTO: 6.4 K/UL (ref 1.3–7.7)
NEUTROPHILS NFR BLD AUTO: 72 %
PLATELET # BLD AUTO: 342 K/UL (ref 150–450)
POTASSIUM SERPL-SCNC: 3.7 MMOL/L (ref 3.5–5.1)
SODIUM SERPL-SCNC: 136 MMOL/L (ref 137–145)
WBC # BLD AUTO: 8.9 K/UL (ref 3.8–10.6)

## 2022-04-19 RX ADMIN — APIXABAN SCH MG: 5 TABLET, FILM COATED ORAL at 08:48

## 2022-04-19 RX ADMIN — SPIRONOLACTONE SCH MG: 25 TABLET, FILM COATED ORAL at 08:48

## 2022-04-19 RX ADMIN — METOPROLOL SUCCINATE SCH MG: 100 TABLET, EXTENDED RELEASE ORAL at 08:51

## 2022-04-19 RX ADMIN — CLOPIDOGREL BISULFATE SCH MG: 75 TABLET ORAL at 08:49

## 2022-04-19 RX ADMIN — AMIODARONE HYDROCHLORIDE SCH MG: 200 TABLET ORAL at 20:09

## 2022-04-19 RX ADMIN — Medication SCH MG: at 08:50

## 2022-04-19 RX ADMIN — COLCHICINE SCH MG: 0.6 TABLET, FILM COATED ORAL at 20:09

## 2022-04-19 RX ADMIN — RANOLAZINE SCH MG: 500 TABLET, FILM COATED, EXTENDED RELEASE ORAL at 08:49

## 2022-04-19 RX ADMIN — METOPROLOL TARTRATE SCH MG: 25 TABLET, FILM COATED ORAL at 00:41

## 2022-04-19 RX ADMIN — COLCHICINE SCH MG: 0.6 TABLET, FILM COATED ORAL at 08:49

## 2022-04-19 RX ADMIN — FUROSEMIDE SCH MG: 20 TABLET ORAL at 14:45

## 2022-04-19 RX ADMIN — APIXABAN SCH MG: 5 TABLET, FILM COATED ORAL at 20:09

## 2022-04-19 RX ADMIN — ATORVASTATIN CALCIUM SCH MG: 80 TABLET, FILM COATED ORAL at 08:49

## 2022-04-19 RX ADMIN — RANOLAZINE SCH MG: 500 TABLET, FILM COATED, EXTENDED RELEASE ORAL at 20:09

## 2022-04-19 RX ADMIN — AMIODARONE HYDROCHLORIDE SCH MG: 200 TABLET ORAL at 08:48

## 2022-04-19 RX ADMIN — LIDOCAINE HYDROCHLORIDE ANHYDROUS AND DEXTROSE MONOHYDRATE SCH MLS/HR: .8; 5 INJECTION, SOLUTION INTRAVENOUS at 14:44

## 2022-04-19 RX ADMIN — METOPROLOL SUCCINATE SCH MG: 100 TABLET, EXTENDED RELEASE ORAL at 20:09

## 2022-04-19 NOTE — P.PN
Progress Note - Text


Progress Note Date: 04/19/22





Chief Complaint: Chest pain





This is a pleasant 61-year-old patient who follows a Dr. Jeri Cespedes.  Chronic 

stable medical conditions include hypertension, hyperlipidemia, anxiety, gout 

for which patient takes allopurinol.  Patient has a known history of coronary 

artery bypass in 2003.  Patient had a cardiac catheterization in 2018.  Was 

found to have chronically occluded vessels.  Medical management was done.  

Patient has cotton used to taking medications when necessary for his angina.


Yesterday patient started having episodes of significant perspiration.  Took his

nitroglycerin with some help.  With the chest pain persisted.  Felt like a 

heaviness all the time.  No radiation.  Tired rundown.  Since patient gets pain 

all the time she was not sure oftentimes what different to do.  This morning 

patient's troponin peaked at 21.  ST elevation in inferior leads.  Patient was 

taken to the cath lab by Dr. MELQUIADES Ott.  Intervention in terms of angioplasty 

stenting was unsuccessful.  More details in his records.  He did speak to the 

patient's wife.


Patient's currently in the ICU.  Still having some pain.  Nitro paste has been 

ordered.


April 15: ICU: Sitting up in bed.  Still having some chest pain.  On Nitropaste.

 Oral intake fair.  Being followed by cardiology.


April 16: ICU: Patient last admin into atrial fibrillation.  Was put on a 

Cardizem drip.  His Zestril dose was cut back.  Still having some chest pain.  

In bed


April 17: ICU: Sinus rhythm.  Some stress pressure.  Slight perspiration.  On 

Cardizem drip.  Started on amiodarone per cardiology for aberrancy.  Lopressor 

increased.


April 18: Patient earlier today went into monomorphic then polymorphic V. tach. 

Was given IV Lopressor bolus, IV amiodarone bolus.  IV lidocaine.  Moved back to

the ICU.  Patient is having some chest discomfort perspiration.  Seen by Dr. Tavon Richmond from cardiology.  Changed to by mouth amiodarone subsequently.


April 19: ICU: There is in sinus rhythm.  IV lidocaine drip.  No chest pain no 

shortness breath.  Feeling better.  Up to the bathroom.  No more episodes of V. 

tach








Active Medications





Allopurinol (Allopurinol 300 Mg Tab)  300 mg PO DAILY JOYCELYN


   Last Admin: 04/19/22 08:49 Dose:  300 mg


   Documented by: 


Amiodarone HCl (Amiodarone 200 Mg Tab)  400 mg PO BID [facility]


   Last Admin: 04/19/22 08:48 Dose:  400 mg


   Documented by: 


Apixaban (Apixaban 5 Mg Tab)  5 mg PO BID [facility]; Protocol


   Last Admin: 04/19/22 08:48 Dose:  5 mg


   Documented by: 


Atorvastatin Calcium (Atorvastatin 80 Mg Tab)  80 mg PO DAILY [facility]


   Last Admin: 04/19/22 08:49 Dose:  80 mg


   Documented by: 


Clopidogrel Bisulfate (Clopidogrel 75 Mg Tab)  75 mg PO DAILY [facility]


   Last Admin: 04/19/22 08:49 Dose:  75 mg


   Documented by: 


Colchicine (Colchicine 0.6 Mg Each)  0.6 mg PO BID [facility]


   Last Admin: 04/19/22 08:49 Dose:  0.6 mg


   Documented by: 


Furosemide (Furosemide 20 Mg Tab)  20 mg PO DAILY [facility]


Hydromorphone HCl (Hydromorphone 0.5 Mg/0.5 Ml Syringe)  0.5 mg IVP Q6HR PRN


   PRN Reason: Pain


   Last Admin: 04/18/22 17:00 Dose:  0.5 mg


   Documented by: 


Lidocaine HCl/Dextrose 2,000 (mg/ IV Solution)  250 mls @ 15 mls/hr IV .[id number] 

[facility]


   Last Admin: 04/18/22 23:44 Dose:  2 mg/min, 15 mls/hr


   Documented by: 


Metoprolol Succinate (Metoprolol Succinate (Er) 100 Mg Tab.Er.24h)  100 mg PO 

BID [facility]


   Last Admin: 04/19/22 08:51 Dose:  100 mg


   Documented by: 


Miscellaneous Information (Potassium Replacement Protocol 1 Each Misc)  1 each 

MISCELLANE DAILY PRN; Protocol


   PRN Reason: Per Protocol


Naloxone HCl (Naloxone 0.4 Mg/Ml 1 Ml Vial)  0.2 mg IV Q2M PRN


   PRN Reason: Opioid Reversal


Ranolazine (Ranolazine 500 Mg Tab.Er.12h)  500 mg PO Q12HR [facility]


   Last Admin: 04/19/22 08:49 Dose:  500 mg


   Documented by: 


Spironolactone (Spironolactone 25 Mg Tab)  25 mg PO DAILY [facility]


   Last Admin: 04/19/22 08:48 Dose:  25 mg


   Documented by: 


Thiamine HCl (Thiamine 100 Mg Tab)  100 mg PO DAILY [facility]


   Last Admin: 04/19/22 08:50 Dose:  100 mg


   Documented by: 























Past medical history to include:


CAD with stent/bypass, hypertension, hyperlipidemia, gout





Social history:


No smoking.  Drinks about 6-8 beers twice a week.  Does one or 2 joints of 

marijuana every day.  .  .





Family history:


Reviewed, noncontributory to presentation





Physical examination:


VITAL SIGNS: 98.6, 88, 14, 141/86, 94% room air


GENERAL: reclining in chair awake comfortable


EYES: Pupils equal.  Conjunctiva normal.


HEENT: External appearance of nose and ears normal, oral cavity grossly normal.


NECK: JVD not raised; masses not palpable.


HEART: First and second heart sounds are normal;  no edema.  


LUNGS: Respiratory rate normal; clear to auscultation.  


ABDOMEN: Soft,  nontender, liver spleen not palpable, no masses palpable.  


PSYCH: Alert and oriented x3;  mood  and affect normal.  


MUSCULOSKELETAL:No Clubbing/cyanosis;muscles-grossly intact.








INVESTIGATIONS, reviewed in the clinical context:


April 19: White count 8.9 hemoglobin 13.8 potassium 3.7 creatinine 1.06


April 18: White count 10.8 hemoglobin 14 platelets 293 potassium 3.319 

creatinine 1.01.  Troponin I 15 


April 17: White count 12.7 hemoglobin 13.6and 3.8 creatinine 1.01


April 16: White count 16.7 hemoglobin 14.4 potassium 3.9 creatinine 0.97


April 15: White count 7.7 hemoglobin 14.8 sodium 132 potassium 4.1 creatinine 

0.7  ALT 69


2-D echocardiogram: Moderate concentric LVH.  EF 45-50%.  Multiple wall motion 

abnormality.


White count 15.5 hemoglobin 15.6 platelets 295 sodium 135 potassium 3.9 

creatinine 0.83


Total bilirubin 1.4  ALT 51


 troponin I less than 0.012, 21.3


LDL 54


EKG tracing personally reviewed by me-ST elevation inferiorly sinus rhythm


Chest x-ray film personally reviewed by me-post bypass changes.  No obvious 

infiltrate





Assessment and plan:





-Acute ST elevation myocardial infarction of the inferior wall


Unsuccessful coronary intervention.  For supportive care.  Follow with 

cardiology





-Mono and polymorphic V. tach, nonsustained


IV lidocaine drip.  By mouth amiodarone...  Toprol- mg twice a day





-Paroxysmal atrial fibrillation with rapid ventricular rate: sinus rhythm


IV Cardizem drip discontinued.  Toprol- mg twice a day





-Post infarct angina: Penta


Nitropaste.  Ranexa 500 mg every 12.  Toprol- mg twice a day





-CAD with a prior history of bypass in 2003.  Patient has chronically occluded 

vessels.


Aspirin 81 mg daily, Lipitor 80 mg daily, Plavix) 5 mg daily.  Imdur 30 mg 

daily.  Zestril 5 mg day.  Toprol- mg twice a day





-Recreational marijuana use





-Obesity BMI 31.7


Weight loss measures





-Essential hypertension


Lopressor Toprol- mg twice a day





-Hyperlipidemia


Lipitor 80 mg daily





-Chronic gout


Allopurinol 300 mg daily











IV lidocaine drip.  Toprol- mg twice a day..  By mouth amiodarone.  Follow

with [facility].  Discussed with patient.

## 2022-04-19 NOTE — P.PN
Subjective


Progress Note Date: 04/19/22


The patient is a 61-year-old male with past medical history of coronary artery 

disease status post bypass, who is currently admitted to the hospital with an 

inferior wall myocardial function.  The patient underwent coronary angiogram on 

04/14/2022 with Dr. MELQUIADES Ott.  He was found to have severe triple-vessel 

disease, with occlusion of SVG to RCA with thrombus.  Attempted mechanical 

thrombectomy was not successful.  The patient was transferred to the ICU on 

04/18/2022 for runs of monomorphic ventricular tachycardia.  The patient was 

treated with IV amiodarone, IV metoprolol, and subsequently placed on a 

lidocaine drip.





The patient did well overnight.  Occasional PVCs were noted on telemetry, howeve

r no additional runs of ventricular tachycardia.  The patient was interviewed 

and examined sitting up comfortably in hospital bed.  He states he will continue

to have occasional diaphoretic episodes, however no chest pain or chest 

pressure.  He states he is breathing well and denies any orthopnea.  Occasional 

palpitations.





GENERAL: Well-appearing, well-nourished and in no acute distress.


NECK: Supple without JVD or thyromegaly.


LUNGS: Breath sounds clear to auscultation bilaterally. Respiration equal and 

unlabored.  No wheezes, rales or rhonchi.


HEART: Regular rate and rhythm, occasional PVCs.  No abnormal heart sounds.  No 

rubs or gallops. S1 and S2 heard.


EXTREMITIES: Normal range of motion, no edema.  No clubbing or cyanosis. 

Peripheral pulses intact and strong.





VITALS:


Blood pressure 108/81, respiratory rate 14, pulse 78, SpO2 96% on 2 L nasal 

cannula.





TELEMETRY:


Sinus rhythm with occasional PVCs.  No runs of ventricular tachycardia or atrial

fibrillation overnight





LABS:


WBC 8.9, hemoglobin 13.8, hematocrit 40.7, platelet 342, sodium 136, potassium 

3.7, BUN 16, creatinine 1.06, magnesium 2.1





IMPRESSION:


Acute inferior wall MI, secondary to occluded SVG to RCA, unsuccessful 

thrombectomy


Monomorphic ventricular tachycardia, secondary to ischemia


Ischemic cardiomyopathy, EF 45% with inferior hypokinesis


Paroxysmal atrial fibrillation, currently on Eliquis


History of CAD, s/p prior CABG


Hypertension


Dyslipidemia


Hypokalemia, supplementation per protocol





PLAN:


Start low-dose furosemide


Spironolactone increased to 25 mg for hypokalemia


Supplement electrolytes per protocol


Switched to metoprolol succinate and increased to 100 mg twice daily


Continue lidocaine drip for the next 24 hours.  Will wean tomorrow if no 

additional episodes of ventricular tachycardia.


Further recommendations will be based on clinical course





I am dictating on behalf of Dr Tavon Richmond's history/physical and 

assessment/plan.








Objective





- Vital Signs


Vital signs: 


                                   Vital Signs











Temp  98.6 F   04/19/22 00:00


 


Pulse  78   04/19/22 07:00


 


Resp  14   04/19/22 07:00


 


BP  108/81   04/19/22 07:00


 


Pulse Ox  96   04/19/22 07:00








                                 Intake & Output











 04/18/22 04/19/22 04/19/22





 18:59 06:59 18:59


 


Intake Total 790 346.25 10


 


Output Total 700 750 


 


Balance 90 -403.75 10


 


Weight  98.7 kg 


 


Intake:   


 


  Intake, IV Titration 340 346.25 10





  Amount   


 


    Lidocaine-D5w Pmx 2G/  226.25 





    250Ml 2,000 mg In   





    Dextrose/Water 1 250ml.   





    bag @ 2 MG/MIN 15 mls/hr   





    IV .S80A06D [facility] Rx#:   





    [id number]   


 


    Sodium Chloride 0.9% 1, 340 120 10





    000 ml @ 0 mls/hr IV .STK   





    -[facility] Rx#:[id number]   


 


  Oral 450  


 


Output:   


 


  Urine 700 750 


 


Other:   


 


  Voiding Method Urinal Urinal 


 


  # Voids  0 0


 


  # Bowel Movements  1 














- Labs


CBC & Chem 7: 


                                 04/19/22 06:00





                                 04/19/22 06:00


Labs: 


                  Abnormal Lab Results - Last 24 Hours (Table)











  04/19/22 Range/Units





  06:00 


 


Sodium  136 L  (137-145)  mmol/L


 


Glucose  106 H  (74-99)  mg/dL

## 2022-04-20 LAB
ANION GAP SERPL CALC-SCNC: 10 MMOL/L
BASOPHILS # BLD AUTO: 0 K/UL (ref 0–0.2)
BASOPHILS NFR BLD AUTO: 0 %
BUN SERPL-SCNC: 16 MG/DL (ref 9–20)
CALCIUM SPEC-MCNC: 8.6 MG/DL (ref 8.4–10.2)
CHLORIDE SERPL-SCNC: 101 MMOL/L (ref 98–107)
CO2 SERPL-SCNC: 27 MMOL/L (ref 22–30)
EOSINOPHIL # BLD AUTO: 0.1 K/UL (ref 0–0.7)
EOSINOPHIL NFR BLD AUTO: 1 %
ERYTHROCYTE [DISTWIDTH] IN BLOOD BY AUTOMATED COUNT: 4.54 M/UL (ref 4.3–5.9)
ERYTHROCYTE [DISTWIDTH] IN BLOOD: 13 % (ref 11.5–15.5)
GLUCOSE SERPL-MCNC: 162 MG/DL (ref 74–99)
HCT VFR BLD AUTO: 42.8 % (ref 39–53)
HGB BLD-MCNC: 14.5 GM/DL (ref 13–17.5)
LYMPHOCYTES # SPEC AUTO: 1.3 K/UL (ref 1–4.8)
LYMPHOCYTES NFR SPEC AUTO: 15 %
MCH RBC QN AUTO: 31.9 PG (ref 25–35)
MCHC RBC AUTO-ENTMCNC: 33.9 G/DL (ref 31–37)
MCV RBC AUTO: 94.2 FL (ref 80–100)
MONOCYTES # BLD AUTO: 0.4 K/UL (ref 0–1)
MONOCYTES NFR BLD AUTO: 5 %
NEUTROPHILS # BLD AUTO: 6.2 K/UL (ref 1.3–7.7)
NEUTROPHILS NFR BLD AUTO: 76 %
PLATELET # BLD AUTO: 365 K/UL (ref 150–450)
POTASSIUM SERPL-SCNC: 3.3 MMOL/L (ref 3.5–5.1)
SODIUM SERPL-SCNC: 138 MMOL/L (ref 137–145)
WBC # BLD AUTO: 8.2 K/UL (ref 3.8–10.6)

## 2022-04-20 RX ADMIN — FUROSEMIDE SCH MG: 20 TABLET ORAL at 08:53

## 2022-04-20 RX ADMIN — APIXABAN SCH MG: 5 TABLET, FILM COATED ORAL at 20:02

## 2022-04-20 RX ADMIN — LIDOCAINE HYDROCHLORIDE ANHYDROUS AND DEXTROSE MONOHYDRATE SCH MLS/HR: .8; 5 INJECTION, SOLUTION INTRAVENOUS at 08:55

## 2022-04-20 RX ADMIN — CLOPIDOGREL BISULFATE SCH MG: 75 TABLET ORAL at 08:53

## 2022-04-20 RX ADMIN — POTASSIUM CHLORIDE SCH MEQ: 20 TABLET, EXTENDED RELEASE ORAL at 11:07

## 2022-04-20 RX ADMIN — AMIODARONE HYDROCHLORIDE SCH MG: 200 TABLET ORAL at 08:52

## 2022-04-20 RX ADMIN — SPIRONOLACTONE SCH MG: 25 TABLET, FILM COATED ORAL at 08:52

## 2022-04-20 RX ADMIN — Medication SCH MG: at 08:52

## 2022-04-20 RX ADMIN — RANOLAZINE SCH MG: 500 TABLET, FILM COATED, EXTENDED RELEASE ORAL at 08:52

## 2022-04-20 RX ADMIN — AMIODARONE HYDROCHLORIDE SCH MG: 200 TABLET ORAL at 20:01

## 2022-04-20 RX ADMIN — APIXABAN SCH MG: 5 TABLET, FILM COATED ORAL at 08:53

## 2022-04-20 RX ADMIN — COLCHICINE SCH MG: 0.6 TABLET, FILM COATED ORAL at 20:02

## 2022-04-20 RX ADMIN — POTASSIUM CHLORIDE SCH MEQ: 20 TABLET, EXTENDED RELEASE ORAL at 08:59

## 2022-04-20 RX ADMIN — RANOLAZINE SCH MG: 500 TABLET, FILM COATED, EXTENDED RELEASE ORAL at 20:02

## 2022-04-20 RX ADMIN — ATORVASTATIN CALCIUM SCH MG: 80 TABLET, FILM COATED ORAL at 08:53

## 2022-04-20 RX ADMIN — COLCHICINE SCH MG: 0.6 TABLET, FILM COATED ORAL at 08:53

## 2022-04-20 NOTE — P.PN
Subjective





This is a woman has not had any further episodes of PMVT VT on IV lidocaine 2 

mg/m


He is doing well he's sitting up comfortably in bed


He sat up in a chair


No heart failure symptoms


No angina





On examination also rate in the 70s


Blood pressure 130/82 mmHg


Normal heart sounds normal S1 normal S2


Breath sounds are clear no rhonchi no crackles


No JVD


No lower extremity edema





Labs are reviewed


White count normal 8.2, hemoglobin 14.5


Platelet count 3 and 65,000 line sodium 138, potassium 3.3


Creatinine 1.3 to BUN 16





Impression


Inferior wall MI


Chronically occluded native RCA and is not amenable to revascularization


A very large SVG to the RCA that underwent stenting but there was a significant 

amount of thrombus


Despite various methods of thrombectomy and aspiration the vessel could not be 

opened up completely on account of this very high thrombus burden


Subsequently he started experiencing episodes of PMVT 4 by monomorphic 

ventricular tachycardia


He also had paroxysms of atrial fibrillation that were also induce VT


He was treated with IV metoprolol 30 mg followed by IV lidocaine


He is settled down quite well





Plan


Increase metoprolol succinate 150 mg in the morning


Continue 100 mg metoprolol succinate in the evening


Reduce the dose of lidocaine to 1 mg/m today


Tomorrow.  Lidocaine line oral amiodarone to continue


Continue low-dose Lasix and spironolactone line watch renal function





Watch for ventricular arrhythmias off lidocaine


His procedure was on the 14th





Objective





- Vital Signs


Vital signs: 


                                   Vital Signs











Temp  98.8 F   04/20/22 04:00


 


Pulse  74   04/20/22 11:00


 


Resp  19   04/20/22 11:00


 


BP  110/90   04/20/22 11:00


 


Pulse Ox  93 L  04/20/22 11:00








                                 Intake & Output











 04/19/22 04/20/22 04/20/22





 18:59 06:59 18:59


 


Intake Total 1905 720 710


 


Output Total 900 200 1


 


Balance 1005 520 709


 


Weight 98.7 kg 96.8 kg 


 


Intake:   


 


  Intake, IV Titration 445 220 350





  Amount   


 


    Lidocaine-D5w Pmx 2G/ 225  250





    250Ml 2,000 mg In   





    Dextrose/Water 1 250ml.   





    bag @ 1 MG/MIN 7.5 mls/hr   





    IV .Q24H [facility] Rx#:   





    [id number]   


 


    Sodium Chloride 0.9% 1, 220 220 100





    000 ml @ 0 mls/hr IV .STK   





    -[facility] Rx#:[id number]   


 


  Oral 1460 500 360


 


Output:   


 


  Urine 900 200 0


 


  Stool   1


 


Other:   


 


  Voiding Method Urinal Urinal Urinal


 


  # Voids 0 1 1


 


  # Bowel Movements 1 1 














- Labs


CBC & Chem 7: 


                                 04/20/22 07:28





                                 04/20/22 07:28


Labs: 


                  Abnormal Lab Results - Last 24 Hours (Table)











  04/20/22 Range/Units





  07:28 


 


Potassium  3.3 L  (3.5-5.1)  mmol/L


 


Creatinine  1.32 H  (0.66-1.25)  mg/dL


 


Glucose  162 H  (74-99)  mg/dL

## 2022-04-20 NOTE — P.PN
Problem: Patient Care Overview  Goal: Plan of Care Review  Outcome: Ongoing (interventions implemented as appropriate)   06/04/19 9871   Coping/Psychosocial   Plan of Care Reviewed With patient;spouse   Plan of Care Review   Progress improving   OTHER   Outcome Summary AOx4, up ad dasha, dc'd vanc, switched to po, IV zosyn, no c/o pain or distress this shift           Subjective





This is a pleasant 61-year-old patient who follows a Dr. Jeri Cespedes.  Chronic 

stable medical conditions include hypertension, hyperlipidemia, anxiety, gout 

for which patient takes allopurinol.  Patient has a known history of coronary 

artery bypass in 2003.  Patient had a cardiac catheterization in 2018.  Was 

found to have chronically occluded vessels.  Medical management was done.  

Patient has cotton used to taking medications when necessary for his angina.


Yesterday patient started having episodes of significant perspiration.  Took his

nitroglycerin with some help.  With the chest pain persisted.  Felt like a 

heaviness all the time.  No radiation.  Tired rundown.  Since patient gets pain 

all the time she was not sure oftentimes what different to do.  This morning 

patient's troponin peaked at 21.  ST elevation in inferior leads.  Patient was 

taken to the cath lab by Dr. MELQUIADES Ott.  Intervention in terms of angioplasty 

stenting was unsuccessful.  More details in his records.  He did speak to the 

patient's wife.


Patient's currently in the ICU.  Still having some pain.  Nitro paste has been 

ordered.


April 15: ICU: Sitting up in bed.  Still having some chest pain.  On Nitropaste.

 Oral intake fair.  Being followed by cardiology.


April 16: ICU: Patient last admin into atrial fibrillation.  Was put on a 

Cardizem drip.  His Zestril dose was cut back.  Still having some chest pain.  

In bed


April 17: ICU: Sinus rhythm.  Some stress pressure.  Slight perspiration.  On 

Cardizem drip.  Started on amiodarone per cardiology for aberrancy.  Lopressor 

increased.


April 18: Patient earlier today went into monomorphic then polymorphic V. tach. 

Was given IV Lopressor bolus, IV amiodarone bolus.  IV lidocaine.  Moved back to

the ICU.  Patient is having some chest discomfort perspiration.  Seen by Dr. Tavon Richmond from cardiology.  Changed to by mouth amiodarone subsequently.


April 19: ICU: There is in sinus rhythm.  IV lidocaine drip.  No chest pain no 

shortness breath.  Feeling better.  Up to the bathroom.  No more episodes of V. 

tach





Subjective: Resuming the care of the patient today 04/20/2022


Patient seen and examined in the ICU he is lying in bed comfortable.  He denies 

chest pain or dyspnea or coughing.  No paroxysmal symptoms.  


-Also complaining of from little redness at the previous IV site on the dorsum 

of his left hand, more towards superficial thrombophlebitis, we'll keep 

monitoring


-Hemodynamically stable.


Remains on lidocaine drip was possible discontinued today per cardiology team.  

He is currently on metoprolol 150 mg daily and 100 mg at bedtime, also 

amiodarone 400 mg twice a day





Objective





- Vital Signs


Vital signs: 


                                   Vital Signs











Temp  98.8 F   04/20/22 04:00


 


Pulse  75   04/20/22 07:00


 


Resp  12   04/20/22 07:00


 


BP  124/89   04/20/22 07:00


 


Pulse Ox  94 L  04/20/22 07:00








                                 Intake & Output











 04/19/22 04/20/22 04/20/22





 18:59 06:59 18:59


 


Intake Total 1905 720 20


 


Output Total 900 200 


 


Balance 1005 520 20


 


Weight 98.7 kg 96.8 kg 


 


Intake:   


 


  Intake, IV Titration 445 220 20





  Amount   


 


    Lidocaine-D5w Pmx 2G/ 225  





    250Ml 2,000 mg In   





    Dextrose/Water 1 250ml.   





    bag @ 2 MG/MIN 15 mls/hr   





    IV .[id number] [facility] Rx#:   





    [id number]   


 


    Sodium Chloride 0.9% 1, 220 220 20





    000 ml @ 0 mls/hr IV .STK   





    -[facility] Rx#:[id number]   


 


  Oral 1460 500 


 


Output:   


 


  Urine 900 200 


 


Other:   


 


  Voiding Method Urinal Urinal 


 


  # Voids 0 1 0


 


  # Bowel Movements 1 1 














- Exam





GENERAL: The patient is alert and oriented x3, not in any acute distress. Well 

developed, well nourished. 


HEENT: Pupils are round and equally reacting to light. EOMI. No scleral icterus.

No conjunctival pallor. Normocephalic, atraumatic. No pharyngeal erythema. No 

thyromegaly. 


CARDIOVASCULAR: S1 and S2 present. No murmurs, rubs, or gallops. 


PULMONARY: Chest is clear to auscultation, no wheezing or crackles. 


ABDOMEN: Soft, nontender, nondistended, normoactive bowel sounds. No palpable 

organomegaly. 


MUSCULOSKELETAL: No joint swelling or deformity. 


-EXTREMITIES: No cyanosis, clubbing, or pedal edema.  Mild superficial 

thrombophlebitis on the dorsum of the left hand


NEUROLOGICAL: Gross neurological examination did not reveal any focal deficits. 


SKIN: No rashes. no petechiae.





- Labs


CBC & Chem 7: 


                                 04/20/22 07:28





                                 04/20/22 07:28


Labs: 


                  Abnormal Lab Results - Last 24 Hours (Table)











  04/20/22 Range/Units





  07:28 


 


Potassium  3.3 L  (3.5-5.1)  mmol/L


 


Creatinine  1.32 H  (0.66-1.25)  mg/dL


 


Glucose  162 H  (74-99)  mg/dL














Assessment and Plan


Assessment: 





-Acute ST elevation myocardial infarction of the inferior wall


Unsuccessful coronary intervention.  Patient currently is asymptomatic.  Follow 

with cardiology will plan to treat him medically for now.  Continue with Plavix 

and other cardiac medication





-Mono and polymorphic V. tach, nonsustained


IV lidocaine drip.  By mouth amiodarone...  Metoprolol , 150 mg daily and 100 mg

at bedtime





-Paroxysmal atrial fibrillation with rapid ventricular rate: sinus 


continue with metoprolol, amiodarone Preoperative continue with xarelto , which 

his home medication as he confirms to me





-Left hand superficial thrombophlebitis 


 supportive care.  No need for antibiotics for now





-CAD with a prior history of bypass in 2003.  Patient has chronically occluded 

vessels.


Aspirin 81 mg daily, Lipitor 80 mg daily, Plavix) 5 mg daily.  Imdur 30 mg 

daily.  Zestril 5 mg day.  Toprol- mg twice a day





- history of Recreational marijuana use





-Obesity BMI 31.7


Weight loss measures





-Essential hypertension


Lopressor Toprol





-Hyperlipidemia


Lipitor 80 mg daily





-Chronic gout


Allopurinol 300 mg daily

## 2022-04-21 LAB
ANION GAP SERPL CALC-SCNC: 9 MMOL/L
BASOPHILS # BLD AUTO: 0.1 K/UL (ref 0–0.2)
BASOPHILS NFR BLD AUTO: 1 %
BUN SERPL-SCNC: 15 MG/DL (ref 9–20)
CALCIUM SPEC-MCNC: 8.7 MG/DL (ref 8.4–10.2)
CHLORIDE SERPL-SCNC: 101 MMOL/L (ref 98–107)
CO2 SERPL-SCNC: 28 MMOL/L (ref 22–30)
EOSINOPHIL # BLD AUTO: 0.1 K/UL (ref 0–0.7)
EOSINOPHIL NFR BLD AUTO: 1 %
ERYTHROCYTE [DISTWIDTH] IN BLOOD BY AUTOMATED COUNT: 4.8 M/UL (ref 4.3–5.9)
ERYTHROCYTE [DISTWIDTH] IN BLOOD: 12.2 % (ref 11.5–15.5)
GLUCOSE SERPL-MCNC: 123 MG/DL (ref 74–99)
HCT VFR BLD AUTO: 45.5 % (ref 39–53)
HGB BLD-MCNC: 15 GM/DL (ref 13–17.5)
LYMPHOCYTES # SPEC AUTO: 1.8 K/UL (ref 1–4.8)
LYMPHOCYTES NFR SPEC AUTO: 17 %
MCH RBC QN AUTO: 31.3 PG (ref 25–35)
MCHC RBC AUTO-ENTMCNC: 33 G/DL (ref 31–37)
MCV RBC AUTO: 94.9 FL (ref 80–100)
MONOCYTES # BLD AUTO: 0.9 K/UL (ref 0–1)
MONOCYTES NFR BLD AUTO: 8 %
NEUTROPHILS # BLD AUTO: 7.7 K/UL (ref 1.3–7.7)
NEUTROPHILS NFR BLD AUTO: 72 %
PLATELET # BLD AUTO: 433 K/UL (ref 150–450)
POTASSIUM SERPL-SCNC: 3.7 MMOL/L (ref 3.5–5.1)
SODIUM SERPL-SCNC: 138 MMOL/L (ref 137–145)
WBC # BLD AUTO: 10.8 K/UL (ref 3.8–10.6)

## 2022-04-21 RX ADMIN — ATORVASTATIN CALCIUM SCH MG: 80 TABLET, FILM COATED ORAL at 08:26

## 2022-04-21 RX ADMIN — RANOLAZINE SCH MG: 500 TABLET, FILM COATED, EXTENDED RELEASE ORAL at 08:28

## 2022-04-21 RX ADMIN — FUROSEMIDE SCH MG: 20 TABLET ORAL at 08:27

## 2022-04-21 RX ADMIN — SPIRONOLACTONE SCH MG: 25 TABLET, FILM COATED ORAL at 08:28

## 2022-04-21 RX ADMIN — RANOLAZINE SCH MG: 500 TABLET, FILM COATED, EXTENDED RELEASE ORAL at 20:24

## 2022-04-21 RX ADMIN — METOPROLOL SUCCINATE SCH MG: 50 TABLET, EXTENDED RELEASE ORAL at 08:28

## 2022-04-21 RX ADMIN — APIXABAN SCH MG: 5 TABLET, FILM COATED ORAL at 08:25

## 2022-04-21 RX ADMIN — COLCHICINE SCH MG: 0.6 TABLET, FILM COATED ORAL at 20:24

## 2022-04-21 RX ADMIN — LIDOCAINE HYDROCHLORIDE ANHYDROUS AND DEXTROSE MONOHYDRATE SCH MLS/HR: .8; 5 INJECTION, SOLUTION INTRAVENOUS at 22:05

## 2022-04-21 RX ADMIN — COLCHICINE SCH MG: 0.6 TABLET, FILM COATED ORAL at 08:26

## 2022-04-21 RX ADMIN — CLOPIDOGREL BISULFATE SCH MG: 75 TABLET ORAL at 08:26

## 2022-04-21 RX ADMIN — APIXABAN SCH MG: 5 TABLET, FILM COATED ORAL at 20:24

## 2022-04-21 RX ADMIN — Medication SCH MG: at 08:28

## 2022-04-21 RX ADMIN — LOPERAMIDE HYDROCHLORIDE PRN MG: 2 CAPSULE ORAL at 13:35

## 2022-04-21 RX ADMIN — METOPROLOL SUCCINATE SCH MG: 50 TABLET, EXTENDED RELEASE ORAL at 18:01

## 2022-04-21 NOTE — P.PN
Subjective


Progress Note Date: 04/21/22


The patient is a 61-year-old male with past medical history of coronary artery 

disease status post bypass, who is currently admitted to the hospital with an 

inferior wall myocardial function.  The patient underwent coronary angiogram on 

04/14/2022 with Dr. MELQUIADES Ott.  He was found to have severe triple-vessel 

disease, with occlusion of SVG to RCA with thrombus.  Attempted mechanical 

thrombectomy was not successful.  The patient was transferred to the ICU on 

04/18/2022 for runs of monomorphic ventricular tachycardia.  The patient was 

treated with IV amiodarone, IV metoprolol, and subsequently placed on a 

lidocaine drip.  Initial echocardiogram showed EF of 45%, however repeat limited

echo shows normalization to 55-60%





The patient was interviewed and examined sitting up comfortably in hospital bed.

 He states he will occasionally have some chest discomfort. He states he is 

breathing well and denies any orthopnea.  





GENERAL: Well-appearing, well-nourished and in no acute distress.


NECK: Supple without JVD or thyromegaly.


LUNGS: Breath sounds clear to auscultation bilaterally. Respiration equal and 

unlabored.  No wheezes, rales or rhonchi.


HEART: Regular rate and rhythm. No abnormal heart sounds.  No rubs or gallops. 

S1 and S2 heard.


EXTREMITIES: Normal range of motion, no edema.  No clubbing or cyanosis. 

Peripheral pulses intact and strong.





VITALS:


Blood pressure 129/66, respiratory rate 15, pulse 79, temp 98.2F, SpO2 92% on 

room air





TELEMETRY:


Sinus rhythm with occasional PVCs. 


No atrial fibrillation or ventricular tachycardia noted since starting lidocaine

drip





LABS:


WBC 10.8, hemoglobin 15.0, hematocrit 45.5, platelet 0.33, sodium 138, potassium

3.7, BUN 15, creatinine 1.7





IMPRESSION:


Acute inferior wall MI, secondary to occluded SVG to RCA, unsuccessful 

thrombectomy


Monomorphic ventricular tachycardia, secondary to ischemia


Ischemic cardiomyopathy, EF 45% with inferior hypokinesis, normalization on 

repeat echocardiogram


Paroxysmal atrial fibrillation, currently on Eliquis


History of CAD, s/p prior CABG


Hypertension


Dyslipidemia





PLAN:


Discontinue amiodarone and lidocaine drip


Increase metoprolol to 150 mg twice daily


Continue to monitor for ventricular ectopy


Further recommendations will be based on clinical course





I am dictating on behalf of Dr Tavon Richmond's history/physical and 

assessment/plan.








Objective





- Vital Signs


Vital signs: 


                                   Vital Signs











Temp  98.2 F   04/21/22 08:15


 


Pulse  79   04/21/22 08:15


 


Resp  15   04/21/22 08:15


 


BP  129/66   04/21/22 08:15


 


Pulse Ox  92 L  04/21/22 08:15








                                 Intake & Output











 04/20/22 04/21/22 04/21/22





 18:59 06:59 18:59


 


Intake Total 1370 110 30


 


Output Total 4 301 


 


Balance 1366 -191 30


 


Weight  96.8 kg 


 


Intake:   


 


  IV   20


 


    Sodium Chloride 0.9% 1,   20





    000 ml @ 75 mls/hr IV .   





    [id number] STA Rx#:[id number]   


 


  Intake, IV Titration 510 110 10





  Amount   


 


    Lidocaine-D5w Pmx 2G/ 250  





    250Ml 2,000 mg In   





    Dextrose/Water 1 250ml.   





    bag @ 1 MG/MIN 7.5 mls/hr   





    IV .Q24H Replaced by [facility] Rx#:   





    [id number]   


 


    Sodium Chloride 0.9% 1, 260 90 10





    000 ml @ 0 mls/hr IV .STK   





    -[facility] Rx#:[id number]   


 


    Tirofiban 12.5MG-250Ml Ns  20 





    250 ml @ 0 mls/hr IV .   





    STK-[facility] ONE Rx#:   





    [id number]   


 


  Oral 860  


 


Output:   


 


  Urine 0 300 


 


  Stool 4 1 


 


Other:   


 


  Voiding Method Urinal Urinal 


 


  # Voids 1 0 1


 


  # Bowel Movements  1 1














- Labs


CBC & Chem 7: 


                                 04/21/22 07:10





                                 04/21/22 07:10


Labs: 


                  Abnormal Lab Results - Last 24 Hours (Table)











  04/21/22 04/21/22 Range/Units





  07:10 07:10 


 


WBC  10.8 H   (3.8-10.6)  k/uL


 


Creatinine   1.27 H  (0.66-1.25)  mg/dL


 


Glucose   123 H  (74-99)  mg/dL

## 2022-04-21 NOTE — P.PN
Subjective





This is a pleasant 61-year-old patient who follows a Dr. Jeri Cespedes.  Chronic 

stable medical conditions include hypertension, hyperlipidemia, anxiety, gout 

for which patient takes allopurinol.  Patient has a known history of coronary 

artery bypass in 2003.  Patient had a cardiac catheterization in 2018.  Was 

found to have chronically occluded vessels.  Medical management was done.  

Patient has cotton used to taking medications when necessary for his angina.


Yesterday patient started having episodes of significant perspiration.  Took his

nitroglycerin with some help.  With the chest pain persisted.  Felt like a 

heaviness all the time.  No radiation.  Tired rundown.  Since patient gets pain 

all the time she was not sure oftentimes what different to do.  This morning 

patient's troponin peaked at 21.  ST elevation in inferior leads.  Patient was 

taken to the cath lab by Dr. MELQUIADES Ott.  Intervention in terms of angioplasty 

stenting was unsuccessful.  More details in his records.  He did speak to the 

patient's wife.


Patient's currently in the ICU.  Still having some pain.  Nitro paste has been 

ordered.


April 15: ICU: Sitting up in bed.  Still having some chest pain.  On Nitropaste.

 Oral intake fair.  Being followed by cardiology.


April 16: ICU: Patient last admin into atrial fibrillation.  Was put on a 

Cardizem drip.  His Zestril dose was cut back.  Still having some chest pain.  

In bed


April 17: ICU: Sinus rhythm.  Some stress pressure.  Slight perspiration.  On 

Cardizem drip.  Started on amiodarone per cardiology for aberrancy.  Lopressor 

increased.


April 18: Patient earlier today went into monomorphic then polymorphic V. tach. 

Was given IV Lopressor bolus, IV amiodarone bolus.  IV lidocaine.  Moved back to

the ICU.  Patient is having some chest discomfort perspiration.  Seen by Dr. Tavon Richmond from cardiology.  Changed to by mouth amiodarone subsequently.


April 19: ICU: There is in sinus rhythm.  IV lidocaine drip.  No chest pain no 

shortness breath.  Feeling better.  Up to the bathroom.  No more episodes of V. 

tach





Subjective: Resuming the care of the patient today 04/20/2022


Patient seen and examined in the ICU he is lying in bed comfortable.  He denies 

chest pain or dyspnea or coughing.  No paroxysmal symptoms.  


-Also complaining of from little redness at the previous IV site on the dorsum 

of his left hand, more towards superficial thrombophlebitis, we'll keep 

monitoring


-Hemodynamically stable.


Remains on lidocaine drip was possible discontinued today per cardiology team.  

He is currently on metoprolol 150 mg daily and 100 mg at bedtime, also 

amiodarone 400 mg twice a day








04/21/2022


Patient remains monitored in the ICU.  He denies any symptoms.  In 

hemodynamically stable  Lidocaine drip and amiodarone were stopped today by 

cardiology team 


His metoprolol increased to 150 mg twice a day


Keep monitoring for today per cardiologist


creatinine at baseline 1.2








Objective





- Vital Signs


Vital signs: 


                                   Vital Signs











Temp  98.2 F   04/21/22 08:15


 


Pulse  79   04/21/22 08:15


 


Resp  15   04/21/22 08:15


 


BP  129/66   04/21/22 08:15


 


Pulse Ox  92 L  04/21/22 08:15








                                 Intake & Output











 04/20/22 04/21/22 04/21/22





 18:59 06:59 18:59


 


Intake Total 1370 110 30


 


Output Total 4 301 


 


Balance 1366 -191 30


 


Weight  96.8 kg 


 


Intake:   


 


  IV   20


 


    Sodium Chloride 0.9% 1,   20





    000 ml @ 75 mls/hr IV .   





    [id number] STA Rx#:[id number]   


 


  Intake, IV Titration 510 110 10





  Amount   


 


    Lidocaine-D5w Pmx 2G/ 250  





    250Ml 2,000 mg In   





    Dextrose/Water 1 250ml.   





    bag @ 1 MG/MIN 7.5 mls/hr   





    IV .Q24H [facility] Rx#:   





    [id number]   


 


    Sodium Chloride 0.9% 1, 260 90 10





    000 ml @ 0 mls/hr IV .STK   





    -[facility] Rx#:[id number]   


 


    Tirofiban 12.5MG-250Ml Ns  20 





    250 ml @ 0 mls/hr IV .   





    STK-[facility] Rx#:   





    [id number]   


 


  Oral 860  


 


Output:   


 


  Urine 0 300 


 


  Stool 4 1 


 


Other:   


 


  Voiding Method Urinal Urinal 


 


  # Voids 1 0 1


 


  # Bowel Movements  1 1














- Exam





GENERAL: The patient is alert and oriented x3, not in any acute distress. Well 

developed, well nourished. 


HEENT: Pupils are round and equally reacting to light. EOMI. No scleral icterus.

No conjunctival pallor. Normocephalic, atraumatic. No pharyngeal erythema. No 

thyromegaly. 


CARDIOVASCULAR: S1 and S2 present. No murmurs, rubs, or gallops. 


PULMONARY: Chest is clear to auscultation, no wheezing or crackles. 


ABDOMEN: Soft, nontender, nondistended, normoactive bowel sounds. No palpable 

organomegaly. 


MUSCULOSKELETAL: No joint swelling or deformity. 


-EXTREMITIES: No cyanosis, clubbing, or pedal edema.  Mild superficial 

thrombophlebitis on the dorsum of the left hand


NEUROLOGICAL: Gross neurological examination did not reveal any focal deficits. 


SKIN: No rashes. no petechiae.





- Labs


CBC & Chem 7: 


                                 04/21/22 07:10





                                 04/21/22 07:10


Labs: 


                  Abnormal Lab Results - Last 24 Hours (Table)











  04/21/22 04/21/22 Range/Units





  07:10 07:10 


 


WBC  10.8 H   (3.8-10.6)  k/uL


 


Creatinine   1.27 H  (0.66-1.25)  mg/dL


 


Glucose   123 H  (74-99)  mg/dL














Assessment and Plan


Assessment: 





-Acute ST elevation myocardial infarction of the inferior wall


Unsuccessful coronary intervention.  Patient currently is asymptomatic.  Follow 

with cardiology will plan to treat him medically for now.  Continue with Plavix 

and other cardiac medication





-Mono and polymorphic V. tach, nonsustained


Discontinue lidocaine drip and amiodarone...  Continue with Metoprolol , 150 mg 

daily twice a day





-Paroxysmal atrial fibrillation with rapid ventricular rate: sinus 


continue with metoprolol, , continue with Eliquis , which his home medication as

he confirms to me





-Left hand superficial thrombophlebitis 


 supportive care.  No need for antibiotics for now





-CAD with a prior history of bypass in 2003.  Patient has chronically occluded 

vessels.


Aspirin 81 mg daily, Lipitor 80 mg daily, Plavix) 5 mg daily.  Imdur 30 mg 

daily.  Zestril 5 mg day.  Toprol- mg twice a day





- history of Recreational marijuana use





-Obesity BMI 31.7


Weight loss measures





-Essential hypertension


Lopressor Toprol





-Hyperlipidemia


Lipitor 80 mg daily





-Chronic gout


Allopurinol 300 mg daily

## 2022-04-22 LAB
ANION GAP SERPL CALC-SCNC: 9 MMOL/L
BASOPHILS # BLD AUTO: 0.1 K/UL (ref 0–0.2)
BASOPHILS NFR BLD AUTO: 1 %
BUN SERPL-SCNC: 20 MG/DL (ref 9–20)
CALCIUM SPEC-MCNC: 8.6 MG/DL (ref 8.4–10.2)
CHLORIDE SERPL-SCNC: 101 MMOL/L (ref 98–107)
CO2 SERPL-SCNC: 27 MMOL/L (ref 22–30)
EOSINOPHIL # BLD AUTO: 0.1 K/UL (ref 0–0.7)
EOSINOPHIL NFR BLD AUTO: 1 %
ERYTHROCYTE [DISTWIDTH] IN BLOOD BY AUTOMATED COUNT: 4.44 M/UL (ref 4.3–5.9)
ERYTHROCYTE [DISTWIDTH] IN BLOOD: 12.9 % (ref 11.5–15.5)
GLUCOSE SERPL-MCNC: 105 MG/DL (ref 74–99)
HCT VFR BLD AUTO: 41.3 % (ref 39–53)
HGB BLD-MCNC: 14.2 GM/DL (ref 13–17.5)
LYMPHOCYTES # SPEC AUTO: 1.8 K/UL (ref 1–4.8)
LYMPHOCYTES NFR SPEC AUTO: 20 %
MAGNESIUM SPEC-SCNC: 1.9 MG/DL (ref 1.6–2.3)
MCH RBC QN AUTO: 31.9 PG (ref 25–35)
MCHC RBC AUTO-ENTMCNC: 34.3 G/DL (ref 31–37)
MCV RBC AUTO: 93.1 FL (ref 80–100)
MONOCYTES # BLD AUTO: 0.6 K/UL (ref 0–1)
MONOCYTES NFR BLD AUTO: 6 %
NEUTROPHILS # BLD AUTO: 6.5 K/UL (ref 1.3–7.7)
NEUTROPHILS NFR BLD AUTO: 70 %
PLATELET # BLD AUTO: 474 K/UL (ref 150–450)
POTASSIUM SERPL-SCNC: 3.9 MMOL/L (ref 3.5–5.1)
SODIUM SERPL-SCNC: 137 MMOL/L (ref 137–145)
WBC # BLD AUTO: 9.3 K/UL (ref 3.8–10.6)

## 2022-04-22 RX ADMIN — APIXABAN SCH MG: 5 TABLET, FILM COATED ORAL at 21:25

## 2022-04-22 RX ADMIN — COLCHICINE SCH MG: 0.6 TABLET, FILM COATED ORAL at 21:25

## 2022-04-22 RX ADMIN — APIXABAN SCH MG: 5 TABLET, FILM COATED ORAL at 08:10

## 2022-04-22 RX ADMIN — Medication PRN MG: at 21:25

## 2022-04-22 RX ADMIN — METOPROLOL SUCCINATE SCH MG: 50 TABLET, EXTENDED RELEASE ORAL at 21:25

## 2022-04-22 RX ADMIN — SPIRONOLACTONE SCH MG: 25 TABLET, FILM COATED ORAL at 08:10

## 2022-04-22 RX ADMIN — Medication SCH MG: at 08:10

## 2022-04-22 RX ADMIN — ATORVASTATIN CALCIUM SCH MG: 80 TABLET, FILM COATED ORAL at 08:10

## 2022-04-22 RX ADMIN — COLCHICINE SCH MG: 0.6 TABLET, FILM COATED ORAL at 08:10

## 2022-04-22 RX ADMIN — RANOLAZINE SCH MG: 500 TABLET, FILM COATED, EXTENDED RELEASE ORAL at 08:10

## 2022-04-22 RX ADMIN — CLOPIDOGREL BISULFATE SCH MG: 75 TABLET ORAL at 08:10

## 2022-04-22 RX ADMIN — METOPROLOL SUCCINATE SCH MG: 50 TABLET, EXTENDED RELEASE ORAL at 08:10

## 2022-04-22 RX ADMIN — FUROSEMIDE SCH MG: 20 TABLET ORAL at 08:10

## 2022-04-22 RX ADMIN — LIDOCAINE HYDROCHLORIDE ANHYDROUS AND DEXTROSE MONOHYDRATE SCH: .8; 5 INJECTION, SOLUTION INTRAVENOUS at 16:52

## 2022-04-22 NOTE — P.PN
Subjective





Patient is resting comfortably in bed


He had some short bursts of nonsustained VT and lidocaine was restarted


He denies any chest discomfort dizziness lightheadedness of this time he is 

resting comfortably


Heart rates are normal


Blood pressure is normal


He has a thrombophlebitis with inflammation on has left hand/dorsum


Heart sounds are normal normal S1 normal S2 no murmurs


Breath sounds are clear no rhonchi no crackles


No lower extremity edema





Yesterday the white count was 10.8 thousand


Potassium 3.9


BUN 20 and creatinine 1.3


Magnesium 1.9


 


Impression


Coronary artery disease acute myocardial infarction


Non-revascularizable RCA and SVG to RCA despite multiple attempts


Ischemic cardio myopathy ejection fraction 40-45%


Sustained ventricular tachycardia/, recurrent 4 days after the acute event (VT 

storm)


Requiring IV amiodarone, IV lidocaine and IV metoprolol for termination


Thrombophlebitis dorsum of left hand





Plan


LifeVest


Hold off on single chamber ICD


Discontinue his amiodarone.  He received this for short period time


I will start sotalol 80 mg twice daily starting tomorrow and watch QT interval


Ranexa is being discontinued today


Continue metoprolol succinate 150 mg twice daily


Recommend IV antibiotics for thrombophlebitis





Objective





- Vital Signs


Vital signs: 


                                   Vital Signs











Temp  98.2 F   04/22/22 04:00


 


Pulse  73   04/22/22 09:30


 


Resp  22   04/22/22 09:30


 


BP  116/65   04/22/22 09:30


 


Pulse Ox  95   04/22/22 09:30








                                 Intake & Output











 04/21/22 04/22/22 04/22/22





 18:59 06:59 18:59


 


Intake Total 510 250 360


 


Output Total 328 400 200


 


Balance 182 -150 160


 


Weight  96.5 kg 


 


Intake:   


 


  IV 20  


 


    Sodium Chloride 0.9% 1, 20  





    000 ml @ 75 mls/hr IV .   





    [id number] STA Rx#:[id number]   


 


  Intake, IV Titration 10  





  Amount   


 


    Sodium Chloride 0.9% 1, 10  





    000 ml @ 0 mls/hr IV .STK   





    -[facility] Rx#:[id number]   


 


  Oral 480 250 360


 


Output:   


 


  Urine 325 400 200


 


  Stool 3  


 


Other:   


 


  Voiding Method Bedside Commode Bedside Commode Bedside Commode





  Urinal Urinal


 


  # Voids 1 1 1


 


  # Bowel Movements 1 1 














- Labs


CBC & Chem 7: 


                                 04/22/22 09:38





                                 04/22/22 09:38


Labs: 


                  Abnormal Lab Results - Last 24 Hours (Table)











  04/22/22 04/22/22 Range/Units





  09:38 09:38 


 


Plt Count  474 H   (150-450)  k/uL


 


Glucose   105 H  (74-99)  mg/dL

## 2022-04-22 NOTE — P.PN
Subjective





This is a pleasant 61-year-old patient who follows a Dr. Jeri Cespedes.  Chronic 

stable medical conditions include hypertension, hyperlipidemia, anxiety, gout 

for which patient takes allopurinol.  Patient has a known history of coronary 

artery bypass in 2003.  Patient had a cardiac catheterization in 2018.  Was 

found to have chronically occluded vessels.  Medical management was done.  

Patient has cotton used to taking medications when necessary for his angina.


Yesterday patient started having episodes of significant perspiration.  Took his

nitroglycerin with some help.  With the chest pain persisted.  Felt like a 

heaviness all the time.  No radiation.  Tired rundown.  Since patient gets pain 

all the time she was not sure oftentimes what different to do.  This morning 

patient's troponin peaked at 21.  ST elevation in inferior leads.  Patient was 

taken to the cath lab by Dr. MELQUIADES Ott.  Intervention in terms of angioplasty 

stenting was unsuccessful.  More details in his records.  He did speak to the 

patient's wife.


Patient's currently in the ICU.  Still having some pain.  Nitro paste has been 

ordered.


April 15: ICU: Sitting up in bed.  Still having some chest pain.  On Nitropaste.

 Oral intake fair.  Being followed by cardiology.


April 16: ICU: Patient last admin into atrial fibrillation.  Was put on a 

Cardizem drip.  His Zestril dose was cut back.  Still having some chest pain.  

In bed


April 17: ICU: Sinus rhythm.  Some stress pressure.  Slight perspiration.  On 

Cardizem drip.  Started on amiodarone per cardiology for aberrancy.  Lopressor 

increased.


April 18: Patient earlier today went into monomorphic then polymorphic V. tach. 

Was given IV Lopressor bolus, IV amiodarone bolus.  IV lidocaine.  Moved back to

the ICU.  Patient is having some chest discomfort perspiration.  Seen by Dr. Tavon Richmond from cardiology.  Changed to by mouth amiodarone subsequently.


April 19: ICU: There is in sinus rhythm.  IV lidocaine drip.  No chest pain no 

shortness breath.  Feeling better.  Up to the bathroom.  No more episodes of V. 

tach





Subjective: Resuming the care of the patient today 04/20/2022


Patient seen and examined in the ICU he is lying in bed comfortable.  He denies 

chest pain or dyspnea or coughing.  No paroxysmal symptoms.  


-Also complaining of from little redness at the previous IV site on the dorsum 

of his left hand, more towards superficial thrombophlebitis, we'll keep 

monitoring


-Hemodynamically stable.


Remains on lidocaine drip was possible discontinued today per cardiology team.  

He is currently on metoprolol 150 mg daily and 100 mg at bedtime, also 

amiodarone 400 mg twice a day








04/21/2022


Patient remains monitored in the ICU.  He denies any symptoms.  In 

hemodynamically stable  Lidocaine drip and amiodarone were stopped today by 

cardiology team 


His metoprolol increased to 150 mg twice a day


Keep monitoring for today per cardiologist


creatinine at baseline 1.2





04/22/2022


Patient is clinically stable, he is been evaluated by cardiology team and plan 

for life vest as well as sotalol to be started tomorrow.


History of complaining of from thrombophlebitis of his left wrist area on the 

dorsum site, which looks his stable over the last 3 days, no leukocytosis or 

fever.  Cardiology team recommended IV antibiotics for possible procedure.  We 

started the patient on IV cefazolin, patient informed and he agrees.


Vitals and labs are stable as well.








Objective





- Vital Signs


Vital signs: 


                                   Vital Signs











Temp  98.2 F   04/22/22 04:00


 


Pulse  84   04/22/22 15:30


 


Resp  19   04/22/22 15:30


 


BP  129/84   04/22/22 15:30


 


Pulse Ox  95   04/22/22 15:30








                                 Intake & Output











 04/21/22 04/22/22 04/22/22





 18:59 06:59 18:59


 


Intake Total 510 250 720


 


Output Total 328 400 500


 


Balance 182 -150 220


 


Weight  96.5 kg 


 


Intake:   


 


  IV 20  


 


    Sodium Chloride 0.9% 1, 20  





    000 ml @ 75 mls/hr IV .   





    [id number] STA Rx#:[id number]   


 


  Intake, IV Titration 10  





  Amount   


 


    Sodium Chloride 0.9% 1, 10  





    000 ml @ 0 mls/hr IV .STK   





    -[facility] Rx#:[id number]   


 


  Oral 480 250 720


 


Output:   


 


  Urine 325 400 500


 


  Stool 3  


 


Other:   


 


  Voiding Method Bedside Commode Bedside Commode Bedside Commode





  Urinal Urinal


 


  # Voids 1 1 1


 


  # Bowel Movements 1 1 














- Exam





GENERAL: The patient is alert and oriented x3, not in any acute distress. Well 

developed, well nourished. 


HEENT: Pupils are round and equally reacting to light. EOMI. No scleral icterus.

No conjunctival pallor. Normocephalic, atraumatic. No pharyngeal erythema. No 

thyromegaly. 


CARDIOVASCULAR: S1 and S2 present. No murmurs, rubs, or gallops. 


PULMONARY: Chest is clear to auscultation, no wheezing or crackles. 


ABDOMEN: Soft, nontender, nondistended, normoactive bowel sounds. No palpable 

organomegaly. 


MUSCULOSKELETAL: No joint swelling or deformity. 


-EXTREMITIES: No cyanosis, clubbing, or pedal edema.  Mild superficial 

thrombophlebitis on the dorsum of the left hand


NEUROLOGICAL: Gross neurological examination did not reveal any focal deficits. 


SKIN: No rashes. no petechiae.





- Labs


CBC & Chem 7: 


                                 04/22/22 09:38





                                 04/22/22 09:38


Labs: 


                  Abnormal Lab Results - Last 24 Hours (Table)











  04/22/22 04/22/22 Range/Units





  09:38 09:38 


 


Plt Count  474 H   (150-450)  k/uL


 


Glucose   105 H  (74-99)  mg/dL














Assessment and Plan


Assessment: 





-Acute ST elevation myocardial infarction of the inferior wall


Unsuccessful coronary intervention.  Patient currently is asymptomatic.  Follow 

with cardiology will plan to treat him medically for now.  Continue with Plavix 

and other cardiac medication





-Mono and polymorphic V. tach, nonsustained


Discontinue lidocaine drip and amiodarone...  Continue with Metoprolol , 150 mg 

daily twice a day.  Sotalol is added





-Paroxysmal atrial fibrillation with rapid ventricular rate: sinus 


continue with metoprolol, , continue with Eliquis , which his home medication as

he confirms to me





-Left hand superficial thrombophlebitis 


 supportive care.  Started on IV cefazolin per cardiologist recommendation for 

now





-CAD with a prior history of bypass in 2003.  Patient has chronically occluded 

vessels.


Aspirin 81 mg daily, Lipitor 80 mg daily, Plavix) 5 mg daily.  Imdur 30 mg 

daily.  Zestril 5 mg day.  Toprol- mg twice a day





- history of Recreational marijuana use





-Obesity BMI 31.7


Weight loss measures





-Essential hypertension


Lopressor Toprol





-Hyperlipidemia


Lipitor 80 mg daily





-Chronic gout


Allopurinol 300 mg daily

## 2022-04-23 LAB
ANION GAP SERPL CALC-SCNC: 5 MMOL/L
BASOPHILS # BLD AUTO: 0.1 K/UL (ref 0–0.2)
BASOPHILS NFR BLD AUTO: 1 %
BUN SERPL-SCNC: 16 MG/DL (ref 9–20)
CALCIUM SPEC-MCNC: 8.6 MG/DL (ref 8.4–10.2)
CHLORIDE SERPL-SCNC: 103 MMOL/L (ref 98–107)
CO2 SERPL-SCNC: 28 MMOL/L (ref 22–30)
EOSINOPHIL # BLD AUTO: 0.1 K/UL (ref 0–0.7)
EOSINOPHIL NFR BLD AUTO: 1 %
ERYTHROCYTE [DISTWIDTH] IN BLOOD BY AUTOMATED COUNT: 4.56 M/UL (ref 4.3–5.9)
ERYTHROCYTE [DISTWIDTH] IN BLOOD: 12.3 % (ref 11.5–15.5)
GLUCOSE SERPL-MCNC: 94 MG/DL (ref 74–99)
HCT VFR BLD AUTO: 42.9 % (ref 39–53)
HGB BLD-MCNC: 14 GM/DL (ref 13–17.5)
LYMPHOCYTES # SPEC AUTO: 1.7 K/UL (ref 1–4.8)
LYMPHOCYTES NFR SPEC AUTO: 19 %
MCH RBC QN AUTO: 30.7 PG (ref 25–35)
MCHC RBC AUTO-ENTMCNC: 32.6 G/DL (ref 31–37)
MCV RBC AUTO: 94.1 FL (ref 80–100)
MONOCYTES # BLD AUTO: 0.6 K/UL (ref 0–1)
MONOCYTES NFR BLD AUTO: 7 %
NEUTROPHILS # BLD AUTO: 6.2 K/UL (ref 1.3–7.7)
NEUTROPHILS NFR BLD AUTO: 70 %
PLATELET # BLD AUTO: 460 K/UL (ref 150–450)
POTASSIUM SERPL-SCNC: 4.2 MMOL/L (ref 3.5–5.1)
SODIUM SERPL-SCNC: 136 MMOL/L (ref 137–145)
WBC # BLD AUTO: 8.9 K/UL (ref 3.8–10.6)

## 2022-04-23 RX ADMIN — LOPERAMIDE HYDROCHLORIDE PRN MG: 2 CAPSULE ORAL at 20:49

## 2022-04-23 RX ADMIN — SPIRONOLACTONE SCH MG: 25 TABLET, FILM COATED ORAL at 08:46

## 2022-04-23 RX ADMIN — COLCHICINE SCH MG: 0.6 TABLET, FILM COATED ORAL at 20:56

## 2022-04-23 RX ADMIN — COLCHICINE SCH MG: 0.6 TABLET, FILM COATED ORAL at 08:48

## 2022-04-23 RX ADMIN — FUROSEMIDE SCH MG: 20 TABLET ORAL at 08:44

## 2022-04-23 RX ADMIN — SOTALOL HYDROCHLORIDE SCH MG: 80 TABLET ORAL at 20:56

## 2022-04-23 RX ADMIN — SOTALOL HYDROCHLORIDE SCH MG: 80 TABLET ORAL at 07:55

## 2022-04-23 RX ADMIN — METOPROLOL SUCCINATE SCH MG: 50 TABLET, EXTENDED RELEASE ORAL at 20:48

## 2022-04-23 RX ADMIN — METOPROLOL SUCCINATE SCH MG: 50 TABLET, EXTENDED RELEASE ORAL at 08:47

## 2022-04-23 RX ADMIN — ATORVASTATIN CALCIUM SCH MG: 80 TABLET, FILM COATED ORAL at 08:46

## 2022-04-23 RX ADMIN — Medication PRN MG: at 23:19

## 2022-04-23 RX ADMIN — CLOPIDOGREL BISULFATE SCH MG: 75 TABLET ORAL at 08:48

## 2022-04-23 RX ADMIN — LIDOCAINE HYDROCHLORIDE ANHYDROUS AND DEXTROSE MONOHYDRATE SCH: .8; 5 INJECTION, SOLUTION INTRAVENOUS at 06:52

## 2022-04-23 RX ADMIN — APIXABAN SCH MG: 5 TABLET, FILM COATED ORAL at 08:46

## 2022-04-23 RX ADMIN — Medication SCH MG: at 08:44

## 2022-04-23 RX ADMIN — APIXABAN SCH MG: 5 TABLET, FILM COATED ORAL at 20:49

## 2022-04-23 NOTE — PN
PROGRESS NOTE



DATE OF SERVICE:

04/23/2002



This 61-year-old gentleman admitted with acute ST-segment elevation myocardial

infarction, is being closely monitored with medical treatment. Coronary 
intervention

could not be performed.  The patient also had known 70 tachycardia.  Life vest 
has been

arranged.



Past medical reviewed.



REVIEW OF SYSTEMS:

Cardiovascular system:  No angina or palpitations. Otherwise mentioned earlier.

Respiration: No cough. GI as mentioned earlier. : No dysuria or hematoma.



CURRENT MEDICATIONS:

Reviewed and include: Eliquis, Zyloprim.  Dose and other medications reviewed.



PHYSICAL EXAMINATION:

Pulse 70. Blood pressure is 115/77, respiratory rate 18. HEENT: Conjunctivae 
normal.

Neck: No JVD. Cardiovascular system: S1, S2. Respiration: Few scattered rhonchi 
and

crackles.

Abdomen:  Soft. Nervous system: No focal deficits.



LABS:

Reviewed.  Magnesium is 1.9.  C diff is negative.



ASSESSMENT:

1. Acute ST segment elevation myocardial infarction on medical treatment.

2. ventricular tachycardia.

3. Paroxysmal atrial fibrillation.

4. Left hand superficial thrombophlebitis.

5. History of coronary artery disease.



RECOMMENDATIONS AND DISCUSSION:

Continue with current medications, symptomatic treatment,  recommend to continue

current management.  Continue the antiplatelet treatment, medical treatment. 
Continue

with life vest.  Monitor lytes closely.  Repeat labs.  Prognosis guarded.  
Closely

follow with Cardiology.





MMODL / IJN: [id number] / Job#: [id number]

CYNTHIA

## 2022-04-23 NOTE — P.PN
Subjective


Progress Note Date: 04/23/22


This is Tony rollins NP, I'm dictating on behalf of Dr. Richmond's H&P and A&P.


Patient was interviewed and examined.





Patient is a pleasant 61-year-old male who initially presented to the hospital 

with STEMI.  Patient reports that he is feeling pretty good today, and currently

denies chest pain, shortness of breath, heart palpitations, dizziness, syncope. 

Patient has been scheduled to be fitted with a LifeVest, as the patient does 

have an incidence of thrombophlebitis on the left hand, which will preclude him 

from getting a ICD placed until the infection is resolved.





GENERAL: Well-appearing, well-nourished and in no acute distress.


NECK: Supple without JVD or thyromegaly.


LUNGS: Breath sounds clear to auscultation bilaterally.  Respiration equal and 

unlabored.  No wheezes, rales or rhonchi.


HEART: Regular rate and rhythm without murmurs, rubs or gallops.  S1 and S2 

heard.


EXTREMITIES: Normal range of motion, no edema.  No clubbing or cyanosis.  

Peripheral pulses intact and strong.





VITALS:


Temp 98.5, pulse 71, blood pressure 121/81, O2 saturation 96% on room air





TELEMETRY:


Normal sinus rhythm





LABS:


White count 8.9, hemoglobin 14, platelets 460, sodium 136, potassium 4.2, B1 16,

creatinine 1.21, calcium 8.6





IMPRESSION:


Coronary artery disease with acute myocardial infarction


Non-revascularizable RCA and SVG to RCA despite multiple attempts


Ischemic cardiomyopathy ejection fraction 40-45%


Sustained ventricular tachycardia, recurrent 4 days after the acute event (VT 

Storm) requiring IV amiodarone, IV lidocaine, and IV metoprolol for termination


Thrombophlebitis of the dorsum of left hand








PLAN:


LifeVest replaced today


Hold off on single-chamber ICD pending resolution of thrombophlebitis.


Patient tolerating sotalol 80 mg twice a day.  Continue to monitor QT interval.


Continue metoprolol succinate 150 mg twice a day


Continue antibiotics for thrombophlebitis.


Patient may transfer to the floor today.


Further recommendations based on the patient's clinical course.




















Objective





- Vital Signs


Vital signs: 


                                   Vital Signs











Temp  98.5 F   04/23/22 14:29


 


Pulse  78   04/23/22 14:29


 


Resp  18   04/23/22 14:29


 


BP  115/73   04/23/22 14:29


 


Pulse Ox  96   04/23/22 14:29








                                 Intake & Output











 04/22/22 04/23/22 04/23/22





 18:59 06:59 18:59


 


Intake Total 1130 50 590


 


Output Total 500 900 0


 


Balance 630 -850 590


 


Weight  98.8 kg 


 


Intake:   


 


  Intake, IV Titration 50 50 90





  Amount   


 


    Sodium Chloride 0.9% 1,   40





    000 ml @ 50 mls/hr IV .   





    Q20H JOYCELYN Rx#:[id number]   


 


    ceFAZolin 2 gm In Sodium 50 50 50





    Chloride 0.9% 50 ml @ 100   





    mls/hr IVPB Q8HR JOYCELYN Rx#   





    :[id number]   


 


  Oral 1080  500


 


Output:   


 


  Urine 500 900 0


 


Other:   


 


  Voiding Method Bedside Commode Toilet Toilet





 Urinal Urinal Urinal


 


  # Voids 1 1 1


 


  # Bowel Movements 1  1














- Labs


CBC & Chem 7: 


                                 04/23/22 06:10





                                 04/23/22 06:10


Labs: 


                  Abnormal Lab Results - Last 24 Hours (Table)











  04/23/22 04/23/22 Range/Units





  06:10 06:10 


 


Plt Count  460 H   (150-450)  k/uL


 


Sodium   136 L  (137-145)  mmol/L

## 2022-04-24 VITALS — HEART RATE: 85 BPM | SYSTOLIC BLOOD PRESSURE: 99 MMHG | TEMPERATURE: 98.5 F | DIASTOLIC BLOOD PRESSURE: 84 MMHG

## 2022-04-24 VITALS — RESPIRATION RATE: 17 BRPM

## 2022-04-24 LAB
ALBUMIN SERPL-MCNC: 3.6 G/DL (ref 3.5–5)
ALP SERPL-CCNC: 84 U/L (ref 38–126)
ALT SERPL-CCNC: 83 U/L (ref 4–49)
ANION GAP SERPL CALC-SCNC: 8 MMOL/L
AST SERPL-CCNC: 48 U/L (ref 17–59)
BASOPHILS # BLD AUTO: 0 K/UL (ref 0–0.2)
BASOPHILS NFR BLD AUTO: 0 %
BUN SERPL-SCNC: 14 MG/DL (ref 9–20)
CALCIUM SPEC-MCNC: 8.9 MG/DL (ref 8.4–10.2)
CHLORIDE SERPL-SCNC: 101 MMOL/L (ref 98–107)
CO2 SERPL-SCNC: 29 MMOL/L (ref 22–30)
EOSINOPHIL # BLD AUTO: 0 K/UL (ref 0–0.7)
EOSINOPHIL NFR BLD AUTO: 1 %
ERYTHROCYTE [DISTWIDTH] IN BLOOD BY AUTOMATED COUNT: 4.7 M/UL (ref 4.3–5.9)
ERYTHROCYTE [DISTWIDTH] IN BLOOD: 13 % (ref 11.5–15.5)
GLUCOSE SERPL-MCNC: 109 MG/DL (ref 74–99)
HCT VFR BLD AUTO: 43.9 % (ref 39–53)
HGB BLD-MCNC: 14.8 GM/DL (ref 13–17.5)
LYMPHOCYTES # SPEC AUTO: 1.7 K/UL (ref 1–4.8)
LYMPHOCYTES NFR SPEC AUTO: 20 %
MAGNESIUM SPEC-SCNC: 1.9 MG/DL (ref 1.6–2.3)
MCH RBC QN AUTO: 31.5 PG (ref 25–35)
MCHC RBC AUTO-ENTMCNC: 33.6 G/DL (ref 31–37)
MCV RBC AUTO: 93.5 FL (ref 80–100)
MONOCYTES # BLD AUTO: 0.4 K/UL (ref 0–1)
MONOCYTES NFR BLD AUTO: 5 %
NEUTROPHILS # BLD AUTO: 6.2 K/UL (ref 1.3–7.7)
NEUTROPHILS NFR BLD AUTO: 73 %
PLATELET # BLD AUTO: 586 K/UL (ref 150–450)
POTASSIUM SERPL-SCNC: 4.2 MMOL/L (ref 3.5–5.1)
PROT SERPL-MCNC: 6.8 G/DL (ref 6.3–8.2)
SODIUM SERPL-SCNC: 138 MMOL/L (ref 137–145)
WBC # BLD AUTO: 8.5 K/UL (ref 3.8–10.6)

## 2022-04-24 RX ADMIN — APIXABAN SCH MG: 5 TABLET, FILM COATED ORAL at 08:51

## 2022-04-24 RX ADMIN — METOPROLOL SUCCINATE SCH MG: 50 TABLET, EXTENDED RELEASE ORAL at 08:52

## 2022-04-24 RX ADMIN — SOTALOL HYDROCHLORIDE SCH MG: 80 TABLET ORAL at 07:39

## 2022-04-24 RX ADMIN — LOPERAMIDE HYDROCHLORIDE PRN MG: 2 CAPSULE ORAL at 09:58

## 2022-04-24 RX ADMIN — COLCHICINE SCH MG: 0.6 TABLET, FILM COATED ORAL at 08:52

## 2022-04-24 RX ADMIN — Medication SCH MG: at 08:53

## 2022-04-24 RX ADMIN — CLOPIDOGREL BISULFATE SCH MG: 75 TABLET ORAL at 08:51

## 2022-04-24 RX ADMIN — FUROSEMIDE SCH MG: 20 TABLET ORAL at 08:52

## 2022-04-24 RX ADMIN — ATORVASTATIN CALCIUM SCH MG: 80 TABLET, FILM COATED ORAL at 08:50

## 2022-04-24 RX ADMIN — LIDOCAINE HYDROCHLORIDE ANHYDROUS AND DEXTROSE MONOHYDRATE SCH: .8; 5 INJECTION, SOLUTION INTRAVENOUS at 08:44

## 2022-04-24 RX ADMIN — SPIRONOLACTONE SCH MG: 25 TABLET, FILM COATED ORAL at 08:52

## 2022-04-24 NOTE — P.PN
Subjective


Progress Note Date: 04/24/22


The patient is a 61-year-old male with past medical history of coronary artery 

disease status post bypass, who is currently admitted to the hospital with an 

inferior wall myocardial function.  The patient underwent coronary angiogram on 

04/14/2022 with Dr. MELQUIADES Ott.  He was found to have severe triple-vessel 

disease, with occlusion of SVG to RCA with thrombus.  Attempted mechanical 

thrombectomy was not successful.  The patient was transferred to the ICU on 

04/18/2022 for runs of monomorphic ventricular tachycardia.  The patient was 

treated with IV amiodarone, IV metoprolol, and subsequently placed on a 

lidocaine drip.  Initial echocardiogram showed EF of 45%, however repeat limited

echo shows normalization to 55-60%





The patient was interviewed and examined sitting in the chair.  He denies any ch

est pain or chest pressure.  No shortness of breath.  No heart racing or 

fluttering.  He has been ambulating around the halls without issue.  He is 

currently wearing his LifeVest 





GENERAL: Well-appearing, well-nourished and in no acute distress.


NECK: Supple without JVD or thyromegaly.


LUNGS: Breath sounds clear to auscultation bilaterally. Respiration equal and 

unlabored.  No wheezes, rales or rhonchi.


HEART: Regular rate and rhythm. No abnormal heart sounds.  No rubs or gallops. 

S1 and S2 heard.


EXTREMITIES: Normal range of motion, no edema.  No clubbing or cyanosis. 

Peripheral pulses intact and strong.





VITALS:


Blood pressure 96/68, respiratory rate 17, pulse 77, temp 98.4F





TELEMETRY:


Sinus rhythm.  No ectopy overnight





LABS:


WBC 8.5, hemoglobin 14.8, hematocrit 43.9, platelet 586, sodium 138, potassium 

4.2, BUN 14, creatinine 1.1, AST 48, ALT 83





IMPRESSION:


Acute inferior wall MI, secondary to occluded SVG to RCA, unsuccessful 

thrombectomy


Monomorphic ventricular tachycardia, secondary to ischemia


Ischemic cardiomyopathy, EF 45% with inferior hypokinesis, normalization on rep

eat echocardiogram


Paroxysmal atrial fibrillation, currently on Eliquis


History of CAD, s/p prior CABG


Hypertension


Dyslipidemia





PLAN:


EKG on sotalol shows QT with acceptable parameters


Continue current medication regimen


[facility]


Follow-up with primary cardiologist, Dr. Summers in one week





I am dictating on behalf of Dr Tavon Richmond's history/physical and assessment/p

iveth.














Objective





- Vital Signs


Vital signs: 


                                   Vital Signs











Temp  98.4 F   04/24/22 08:10


 


Pulse  77   04/24/22 08:10


 


Resp  17   04/24/22 08:10


 


BP  96/68   04/24/22 08:10


 


Pulse Ox  96   04/24/22 08:10








                                 Intake & Output











 04/23/22 04/24/22 04/24/22





 18:59 06:59 18:59


 


Intake Total 710  


 


Output Total 0 6 0


 


Balance 710 -6 0


 


Intake:   


 


  Intake, IV Titration 90  





  Amount   


 


    Sodium Chloride 0.9% 1, 40  





    000 ml @ 50 mls/hr IV .   





    Q20H JOYCELYN Rx#:[id number]   


 


    ceFAZolin 2 gm In Sodium 50  





    Chloride 0.9% 50 ml @ 100   





    mls/hr IVPB Q8HR JOYCELYN Rx#   





    :[id number]   


 


  Oral 620  


 


Output:   


 


  Urine 0  0


 


  Stool  6 


 


Other:   


 


  Voiding Method Toilet Toilet Toilet





 Urinal Urinal Urinal


 


  # Voids 2 1 1


 


  # Bowel Movements 1  














- Labs


CBC & Chem 7: 


                                 04/24/22 08:02





                                 04/24/22 08:02

## 2022-04-24 NOTE — DS
DISCHARGE SUMMARY



DATE OF SERVICE:

04/24/2022



FINAL DIAGNOSES:

1. Acute ST segment elevation myocardial infarction, medical treatment.

2. Ventricular tachycardia status post LifeVest.

3. Paroxysmal atrial fibrillation.

4. Left hand superficial thrombophlebitis.

5. History of coronary artery disease.



DISCHARGE DISPOSITION:

The patient will be discharged in stable condition with guarded prognosis.  Cardiology

cleared the patient for discharge.



HISTORY OF PRESENT ILLNESS:

This 61-year-old gentleman was admitted with multiple complex medical issues as listed

above.  Medical treatment was recommended.  Cardiology saw the patient.  LifeVest was

inserted.  See Cardiology notes and other progress notes for further information.



EXAM:

Vitals stable. Cardiovascular: S1, S2.  Abdomen soft.  Nervous system: No focal

deficits.



DISCHARGE RECOMMENDATIONS:

Please refer to discharge med sheet for list of medications.  Recommended metoprolol,

Eliquis, short course of Keflex, Aldactone, Betapace, Colcrys, Plavix and Lasix.

Followup labs with the primary physician as well as Cardiology.  Follow up with Dr. Fernandez as recommended.



MMODL / IJN: [id number] / Job#: [id number]

## 2023-03-21 ENCOUNTER — HOSPITAL ENCOUNTER (OUTPATIENT)
Dept: HOSPITAL 47 - RADXRMAIN | Age: 63
Discharge: HOME | End: 2023-03-21
Attending: FAMILY MEDICINE
Payer: COMMERCIAL

## 2023-03-21 DIAGNOSIS — M43.17: Primary | ICD-10-CM

## 2023-03-21 DIAGNOSIS — M51.37: ICD-10-CM

## 2023-03-21 PROCEDURE — 72110 X-RAY EXAM L-2 SPINE 4/>VWS: CPT

## 2023-03-21 NOTE — XR
EXAMINATION TYPE: XR lumbosacral spine min 4V

 

DATE OF EXAM: 3/21/2023

 

COMPARISON: 4/16/2012

 

HISTORY: Low back pain

 

TECHNIQUE: 5 view lumbar spine

 

FINDINGS: There is a grade 1 spondylolisthesis of L5 anterior on S1. This was present on 2012 compari
son. There is loss of disc height L5-S1 which appears stable from comparison. Remaining disc heights 
are preserved. Vertebral body heights are preserved. Spondylolysis is not clearly identified. This co
uld be further evaluated with CT.

 

IMPRESSION:

1.  Grade 1 spondylolisthesis with loss of disc at L5-S1.